# Patient Record
Sex: FEMALE | Race: WHITE | Employment: FULL TIME | URBAN - METROPOLITAN AREA
[De-identification: names, ages, dates, MRNs, and addresses within clinical notes are randomized per-mention and may not be internally consistent; named-entity substitution may affect disease eponyms.]

---
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
## 2022-01-26 ENCOUNTER — APPOINTMENT (OUTPATIENT)
Dept: CT IMAGING | Age: 22
End: 2022-01-26
Payer: COMMERCIAL

## 2022-01-26 ENCOUNTER — HOSPITAL ENCOUNTER (EMERGENCY)
Age: 22
Discharge: HOME | End: 2022-01-27
Payer: COMMERCIAL

## 2022-01-26 VITALS
RESPIRATION RATE: 16 BRPM | TEMPERATURE: 98.1 F | OXYGEN SATURATION: 100 % | HEART RATE: 100 BPM | DIASTOLIC BLOOD PRESSURE: 70 MMHG | SYSTOLIC BLOOD PRESSURE: 128 MMHG

## 2022-01-26 DIAGNOSIS — S06.0X1A CLOSED HEAD INJURY WITH CONCUSSION, WITH LOSS OF CONSCIOUSNESS OF 30 MINUTES OR LESS, INITIAL ENCOUNTER: Primary | ICD-10-CM

## 2022-01-26 DIAGNOSIS — S16.1XXA CERVICAL STRAIN, ACUTE, INITIAL ENCOUNTER: ICD-10-CM

## 2022-01-26 PROCEDURE — 6370000000 HC RX 637 (ALT 250 FOR IP): Performed by: PHYSICIAN ASSISTANT

## 2022-01-26 PROCEDURE — 72125 CT NECK SPINE W/O DYE: CPT

## 2022-01-26 PROCEDURE — 6360000002 HC RX W HCPCS: Performed by: PHYSICIAN ASSISTANT

## 2022-01-26 PROCEDURE — 99284 EMERGENCY DEPT VISIT MOD MDM: CPT

## 2022-01-26 PROCEDURE — 96372 THER/PROPH/DIAG INJ SC/IM: CPT

## 2022-01-26 PROCEDURE — 70450 CT HEAD/BRAIN W/O DYE: CPT

## 2022-01-26 RX ORDER — PROCHLORPERAZINE MALEATE 5 MG/1
5 TABLET ORAL ONCE
Status: COMPLETED | OUTPATIENT
Start: 2022-01-27 | End: 2022-01-27

## 2022-01-26 RX ORDER — DIPHENHYDRAMINE HCL 25 MG
25 TABLET ORAL ONCE
Status: COMPLETED | OUTPATIENT
Start: 2022-01-27 | End: 2022-01-27

## 2022-01-26 RX ORDER — KETOROLAC TROMETHAMINE 30 MG/ML
30 INJECTION, SOLUTION INTRAMUSCULAR; INTRAVENOUS ONCE
Status: COMPLETED | OUTPATIENT
Start: 2022-01-26 | End: 2022-01-26

## 2022-01-26 RX ORDER — ACETAMINOPHEN 325 MG/1
650 TABLET ORAL ONCE
Status: COMPLETED | OUTPATIENT
Start: 2022-01-26 | End: 2022-01-26

## 2022-01-26 RX ADMIN — KETOROLAC TROMETHAMINE 30 MG: 30 INJECTION, SOLUTION INTRAMUSCULAR at 23:08

## 2022-01-26 RX ADMIN — ACETAMINOPHEN 650 MG: 325 TABLET ORAL at 22:30

## 2022-01-26 ASSESSMENT — PAIN DESCRIPTION - LOCATION: LOCATION: HEAD

## 2022-01-26 ASSESSMENT — PAIN DESCRIPTION - PAIN TYPE: TYPE: ACUTE PAIN

## 2022-01-26 ASSESSMENT — PAIN SCALES - GENERAL
PAINLEVEL_OUTOF10: 8

## 2022-01-26 ASSESSMENT — ENCOUNTER SYMPTOMS
SHORTNESS OF BREATH: 0
VOMITING: 0
ABDOMINAL PAIN: 0

## 2022-01-26 ASSESSMENT — PAIN DESCRIPTION - DESCRIPTORS: DESCRIPTORS: HEADACHE

## 2022-01-27 PROCEDURE — 6370000000 HC RX 637 (ALT 250 FOR IP): Performed by: PHYSICIAN ASSISTANT

## 2022-01-27 RX ADMIN — DIPHENHYDRAMINE HCL 25 MG: 25 TABLET ORAL at 00:12

## 2022-01-27 RX ADMIN — PROCHLORPERAZINE MALEATE 5 MG: 5 TABLET, FILM COATED ORAL at 00:13

## 2022-01-27 NOTE — ED PROVIDER NOTES
[address] ED  EMERGENCY DEPARTMENT ENCOUNTER        Pt Name: Ana Moses  MRN: [id number]  [place] 2000  Date of evaluation: 1/26/2022  Provider: Frankie Boogie PA-C  PCP: No primary care provider on file. Shared Visit or Autonomous Visit: ALEX. I have evaluated this patient. My supervising physician was available for consultation. CHIEF COMPLAINT       Chief Complaint   Patient presents with    Work Related Injury     hit heads with another person yesterday and then hit head in same spot on table today/sent by urgent care/reports LOC for 45 seconds       HISTORY OF PRESENT ILLNESS   (Location/Symptom, Timing/Onset, Context/Setting, Quality, Duration, Modifying Factors, Severity)  Note limiting factors. Ana Moses is a 22 y.o. female presenting to the emergency department for evaluation of head injury. She was at work yesterday states she hit heads with a kid they were on a mat and when he rolled over she was hit in the forehead she stood up and then blacked out states think she got up too quickly states she was out for about 30 minutes. Has been feeling dizzy vertigo symptoms ringing in the ears having headache and today she bent down to get something from under a table and hit her forehead again in the same area on the table and states she blacked out for about 30 seconds at that time. She went to urgent care today and was sent to the ER for evaluation. No vomiting. Initially did not have neck pain started after arrival here. No other injuries. Ambulatory. Last took migraine medicine 8 AM this morning. She does have history of chronic migraines history of concussion 2018. The history is provided by the patient.    Head Injury  Location:  Frontal  Time since incident:  1 day  Chronicity:  New  Associated symptoms: headache, loss of consciousness, neck pain and tinnitus    Associated symptoms: no disorientation, no numbness and no vomiting        Nursing Notes were reviewed    REVIEW OF SYSTEMS    (2-9 systems for level 4, 10 or more for level 5)     Review of Systems   Constitutional: Negative for fever. HENT: Positive for tinnitus. Eyes: Negative for visual disturbance. Respiratory: Negative for shortness of breath. Cardiovascular: Negative for chest pain. Gastrointestinal: Negative for abdominal pain and vomiting. Musculoskeletal: Positive for neck pain. Neurological: Positive for dizziness, loss of consciousness and headaches. Negative for numbness. All other systems reviewed and are negative. Positives and Pertinent negatives as per HPI. PAST MEDICAL HISTORY   No past medical history on file. SURGICAL HISTORY   No past surgical history on file. CURRENTMEDICATIONS       There are no discharge medications for this patient. ALLERGIES     Amoxicillin and Augmentin [amoxicillin-pot clavulanate]    FAMILYHISTORY     No family history on file. SOCIAL HISTORY       Social History     Socioeconomic History    Marital status: Single     Spouse name: Not on file    Number of children: Not on file    Years of education: Not on file    Highest education level: Not on file   Occupational History    Not on file   Tobacco Use    Smoking status: Not on file    Smokeless tobacco: Not on file   Substance and Sexual Activity    Alcohol use: Not on file    Drug use: Not on file    Sexual activity: Not on file   Other Topics Concern    Not on file   Social History Narrative    Not on file     Social Determinants of Health     Financial Resource Strain:     Difficulty of Paying Living Expenses: Not on file   Food Insecurity:     Worried About Running Out of Food in the Last Year: Not on file    Leanne of Food in the Last Year: Not on file   Transportation Needs:     Lack of Transportation (Medical): Not on file    Lack of Transportation (Non-Medical):  Not on file   Physical Activity:     Days of Exercise per Week: Not on file    Minutes of Exercise per Session: Not on file   Stress:     Feeling of Stress : Not on file   Social Connections:     Frequency of Communication with Friends and Family: Not on file    Frequency of Social Gatherings with Friends and Family: Not on file    Attends Faith Services: Not on file    Active Member of [address] [place] [address] or Organizations: Not on file    Attends Club or Organization Meetings: Not on file    Marital Status: Not on file   Intimate Partner Violence:     Fear of Current or Ex-Partner: Not on file    Emotionally Abused: Not on file    Physically Abused: Not on file    Sexually Abused: Not on file   Housing Stability:     Unable to Pay for Housing in the Last Year: Not on file    Number of [place] in the Last Year: Not on file    Unstable Housing in the Last Year: Not on file       SCREENINGS    Sycamore Coma Scale  Eye Opening: Spontaneous  Best Verbal Response: Oriented  Best Motor Response: Obeys commands  Sycamore Coma Scale Score: 15        PHYSICAL EXAM    (up to 7 for level 4, 8 or more for level 5)     ED Triage Vitals [01/26/22 2131]   BP Temp Temp Source Pulse Resp SpO2 Height Weight   128/70 98.1 °F (36.7 °C) Temporal 100 16 100 % -- --       Physical Exam  Vitals and nursing note reviewed. Constitutional:       Appearance: She is well-developed. She is not toxic-appearing. HENT:      Head: Normocephalic. Contusion present. No raccoon eyes, Reed's sign or laceration. Right Ear: Tympanic membrane and ear canal normal. No hemotympanum. Left Ear: Tympanic membrane and ear canal normal. No hemotympanum. Mouth/Throat:      Pharynx: Oropharynx is clear. No pharyngeal swelling or posterior oropharyngeal erythema. Eyes:      Extraocular Movements: Extraocular movements intact. Conjunctiva/sclera: Conjunctivae normal.      Pupils: Pupils are equal, round, and reactive to light. Neck:      Vascular: No JVD.    Cardiovascular:      Rate and Rhythm: Normal rate and regular rhythm. Pulmonary:      Effort: Pulmonary effort is normal. No respiratory distress. Breath sounds: Normal breath sounds. No stridor. No wheezing, rhonchi or rales. Abdominal:      General: Bowel sounds are normal. There is no distension. Palpations: Abdomen is soft. Abdomen is not rigid. There is no mass. Tenderness: There is no abdominal tenderness. There is no guarding or rebound. Musculoskeletal:         General: Normal range of motion. Cervical back: Normal, normal range of motion and neck supple. No tenderness or bony tenderness. Normal range of motion. Skin:     General: Skin is warm and dry. Findings: No rash. Neurological:      Mental Status: She is alert and oriented to person, place, and time. GCS: GCS eye subscore is 4. GCS verbal subscore is 5. GCS motor subscore is 6. Cranial Nerves: Cranial nerves are intact. No cranial nerve deficit. Sensory: Sensation is intact. No sensory deficit. Motor: Motor function is intact. No weakness (5/5 symmetric upper and lower extremities ) or abnormal muscle tone. Coordination: Coordination is intact. Coordination normal. Finger-Nose-Finger Test normal.      Gait: Gait is intact. Psychiatric:         Behavior: Behavior normal.         DIAGNOSTIC RESULTS   LABS:    Labs Reviewed - No data to display    All other labs were within normal range or not returned as of this dictation. EKG: All EKG's are interpreted by the Emergency Department Physician in the absence of a cardiologist.  Please see their note for interpretation of EKG.       RADIOLOGY:   Non-plain film images such as CT, Ultrasound and MRI are read by the radiologist. Plain radiographic images are visualized andpreliminarily interpreted by the  ED Provider with the below findings:        Interpretation perthe Radiologist below, if available at the time of this note:    CT CERVICAL SPINE WO CONTRAST   Final Result   No acute fracture traumatic malalignment of the cervical spine. CT HEAD WO CONTRAST   Final Result   No acute intracranial abnormality. PROCEDURES   Unless otherwise noted below, none     Procedures    CRITICAL CARE TIME   N/A    CONSULTS:  None      EMERGENCY DEPARTMENT COURSE and DIFFERENTIAL DIAGNOSIS/MDM:   Vitals:    Vitals:    01/26/22 2131   BP: 128/70   Pulse: 100   Resp: 16   Temp: 98.1 °F (36.7 °C)   TempSrc: Temporal   SpO2: 100%       Patient was given thefollowing medications:  Medications   acetaminophen (TYLENOL) tablet 650 mg (650 mg Oral Given 1/26/22 2230)   ketorolac (TORADOL) injection 30 mg (30 mg IntraMUSCular Given 1/26/22 2308)   prochlorperazine (COMPAZINE) tablet 5 mg (5 mg Oral Given 1/27/22 0013)   diphenhydrAMINE (BENADRYL) tablet 25 mg (25 mg Oral Given 1/27/22 0012)       ED course  Patient presented to the ER for evaluation of head injury that occurred yesterday after hitting heads with another person and stating she blacked out afterwards. She had another head injury today when she hit her head on the table and states she also briefly lost consciousness. Was initially seen at urgent care and then sent to the ER for evaluation. Normal neurological exam here. No neuro deficits. Up and walking without any difficulty. No vomiting. She does complain of headache, ringing in the ears, dizziness and now also neck pain. She was provided medications here and work-up was obtained. CT brain no acute intracranial normality, no hemorrhage, no fracture, cervical spine no fracture or misalignment. Suspect concussion. Advised brain rest. Continue Tylenol and ibuprofen for pain. Advise close follow-up and return for any worsening. She was provided referral to [address] [place] [address] for work-related injury as well as neurology if needed.       I estimate there is LOW risk for SKULL FRACTURE, SUBARACHNOID HEMORRHAGE, INTRACRANIAL HEMORRHAGE, CERVICAL SPINE INJURY, SUBDURAL OR EPIDURAL HEMATOMA,  thus I consider the discharge disposition reasonable. FINAL IMPRESSION      1. Closed head injury with concussion, with loss of consciousness of 30 minutes or less, initial encounter    2. Cervical strain, acute, initial encounter          DISPOSITION/PLAN   DISPOSITION     PATIENT REFERREDTO:  [facility]  [address]  [address] Christina Gamez [address]  [phone]    Call in 1 day  Call to arrange follow-up appointment for work-related injury    Tyler Lamar [address] [facility] [phone]    As needed    [facility] - Neurology  [facility] [address]  [phone]    As needed    [facility] ED  [address] [facility]  [phone]    If symptoms worsen      DISCHARGE MEDICATIONS:  There are no discharge medications for this patient. DISCONTINUED MEDICATIONS:  There are no discharge medications for this patient.              (Please note that portions ofthis note were completed with a voice recognition program.  Efforts were made to edit the dictations but occasionally words are mis-transcribed.)    Cooper Estrada PA-C (electronically signed)            Quyen Ivy PA-C  01/27/22 5424

## 2022-02-25 ENCOUNTER — HOSPITAL ENCOUNTER (OUTPATIENT)
Dept: PHYSICAL THERAPY | Age: 22
Setting detail: THERAPIES SERIES
Discharge: HOME | End: 2022-02-25
Payer: COMMERCIAL

## 2022-02-25 PROCEDURE — 95992 CANALITH REPOSITIONING PROC: CPT

## 2022-02-25 PROCEDURE — 97162 PT EVAL MOD COMPLEX 30 MIN: CPT

## 2022-02-25 PROCEDURE — 97112 NEUROMUSCULAR REEDUCATION: CPT

## 2022-02-25 NOTE — FLOWSHEET NOTE
[facility] and Therapy, [facility], [facility] [address], [address]   Phone: [phone]  Fax [phone]    Physical Therapy Daily Treatment Note    Date:  2022    Patient Name:  Brendon Barker    :  2000  MRN: [id number]  Restrictions/Precautions:    Medical/Treatment Diagnosis Information:  · Diagnosis:   · S09.90XA (ICD-10-CM) - Unspecified injury of head, initial encounter    · S16. 1XXA (ICD-10-CM) - Strain of muscle, fascia and tendon at neck level, initial encounter  Insurance/Certification information:  PT Insurance Information: Adilson Menard - approved 8 visits (2x/week for 4 weeks) from 22 through 3/17/22  Physician Information:  Referring Practitioner: LADARIUS Reynoso  Plan of care signed (Y/N):  N  Visit# / total visits:       G-Code (if applicable):          [address] [place] [address]  22    Medicare Cap (if applicable):  n/a = total amount used, updated 2022    CPT Code # of charges Time In Time Out Total Time   EVAL:   [] (Low) 19596   [x] (Medium) 18342   [] (High) 35994 1 9:15 9:45 30   Re-Eval       NJ(83511)        NMR (45643) 1 10:00 10:15 15   Manual (09390)       TA (39121)       Gait (94145)       E-stim (un) (78395)                  E-stim (attended) (99958)       Ionto       Vaso       [employer] (58863)             Other: CRT (36188) 1 9:45 10:00 15   Totals   3 9:15 10:15 60     ________________________________________________________________________________________    Pain Level:    /10  SUBJECTIVE:  See initial eval    OBJECTIVE:     Exercise/Equipment Resistance/Repetitions Other comments          Canalith repositioning for R PSC canalithiasis x1 round Intense vertigo which initially stopped her from lying all the way down. No nystagmus once supine and head hanging. Noticed ear popped in position 3. Had a \"sinking\" feeling upon sitting with increased headache that settled after 60 seconds. Other Therapeutic Activities:  Education regarding POC including demonstration with models of anatomy and physiology in order to maximize benefits of treatment; total neuro re-ed 15 minutes    Manual Treatments:         Modalities:      Test/Measurements:  See initial eval       ASSESSMENT:     See initial eval     Treatment/Activity Tolerance:   [x]Patient tolerated treatment well [] Patient limited by fatique  []Patient limited by pain [] Patient limited by other medical complications  [] Other:     GOALS: Goals established 2/25/22   Patient stated goal: improve dizziness  []? Progressing: []? Met: []? Not Met: []? Adjusted     Therapist goals for Patient:  Short Term Goals: To be achieved in: 2 weeks  1. Independent in HEP and progression per patient tolerance, in order to prevent re-injury. []? Progressing: []? Met: []? Not Met: []? Adjusted  2. Patient will have a decrease in dizziness/imbalance/symptoms to 25% to facility improvement in movement, function, balance, and ADLS as indicated by Functional Deficits. []? Progressing: []? Met: []? Not Met: []? Adjusted     Long Term Goals: To be achieved in:  4 weeks  1. Disability index score of 25% or less for the [facility] to assist with reaching prior level of function  []? Progressing: []? Met: []? Not Met: []? Adjusted  2. Patient will demonstrate increased cervical AROM to WNL, joint mobility WNL to allow for proper joint functioning as indicated by Functional Deficits. []? Progressing: []? Met: []? Not Met: []? Adjusted  3. Patient will demonstrate negative oculomotor special testing/positional testing as indicated by Functional Deficits. []? Progressing: []? Met: []? Not Met: []? Adjusted  4. Patient will return to functional activities including lying down, looking up, rolling over without increased symptoms or restriction. []? Progressing: []?  Met: []? Not Met: []? Adjusted  5. Patient will complete full work day without onset of symptoms (patient specific functional goal)  []? Progressing: []? Met: []? Not Met: []?  Adjusted     Plan: [] Continue per plan of care [] Alter current plan (see comments)   [x] Plan of care initiated [] Hold pending MD visit [] Discharge      Plan for Next Session:  Vestibular rehab    Re-Certification Due Date:         Signature:  Yulisa Floyd, PT

## 2022-02-25 NOTE — PROGRESS NOTES
[facility], [facility], [address], [address]  Phone: [phone]  Fax [phone]    Physical Therapy Vestibular Rehabilitation Evaluation  Date:  2022    Dear Referring Practitioner: LADARIUS Boggs,     We had the pleasure of evaluating the following patient for physical therapy services at [address]. A summary of our findings can be found in the initial assessment below. This includes our plan of care. If you have any questions or concerns regarding these findings, please do not hesitate to contact me at the office phone number checked above. Thank you for the referral.      Physician Signature:_______________________________Date:__________________     By signing above (or electronic signature), therapists plan is approved by physician       Patient: Sharon Salgado   : 2000   MRN: [id number]   Referring Physician: Referring Practitioner: LADARIUS Boggs       Evaluation Date: 2022       Medical Diagnosis Information:   Diagnosis:   · S09.90XA (ICD-10-CM) - Unspecified injury of head, initial encounter    · S16. 1XXA (ICD-10-CM) - Strain of muscle, fascia and tendon at neck level, initial encounter                                               Insurance information: PT Insurance Information: Adalid Mayte - approved 8 visits (2x/week for 4 weeks) from 22 through 3/17/22     Precautions/ Contra-indications: none   Latex Allergy:  NO        Preferred Language for Healthcare:   English       other:     CPT Code # of charges Time In Time Out Total Time   EVAL:   [] (Low) 90620   [x] (Medium) 70987   [] (High) 36292 1 9:15 9:45 30   Re-Eval       NJ(92635)        NMR (61845) 1 10:00 10:15 15   Manual (72005)       TA (65922)       Gait (39990)       E-stim (un) (50168)                  E-stim (attended) (43731)       Ionto       Vaso       [employer] (17194) Other: CRT (14151) 1 9:45 10:00 15   Totals   3 9:15 10:15 60         ______________________________________________________________________    SUBJECTIVE:      Referral Date: 1/26/22  Onset Date:  1/25/22 and 1/26/22  Relevant Medical History: anxiety, depression, chronic migraine syndrome    Chief Complaint: Pt reports that she bumped heads with a child at work and blacked out for about 30 seconds. Then the next day bent over at work and bumped her head in the same place and had onset of B hand and feet numbness that lasted for about a minute. Since then she has had chronic headache right at the temples, as well as intense waves of vertigo where the room will spin and she will feel a hot wave of nausea come over her for 30-90 minutes. She can have vomiting with the nausea waves. The only way to relieve it is to go lie down, which isn't always possible at work. Her normal migraines are across the forehead, heaviness that pulsates. The difference is that the temple headache feels more like a stabbing. She notes that she has tried a multitude of things for her migraines in the past, which have not been helpful. She also has history of anxiety and depression and has recently been dealing with anger outbursts - not sure if they are related to the vertigo. Currently in the process of getting new medication for anxiety and depression through Dr. Rainer Aguilar (her new  doctor). Description of symptoms: [x] Vertigo [x]  Off-balance [] Lightheadedness  Scale: Denies currently/10  Symptoms are getting:  [] Better [] Worse  [x] Same [] Episodic  Description of Spells:  [] Constant [x] Spontaneous [] Induced by motion   [] Induced by position changes [] Other:  Length of time spells occur: [] Seconds [x] Minutes  [x] Hours [] Days [] Other:   What increases symptoms? Anything - cannot determine an antecedent  What decreases symptoms?  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   [] Yes, Patient intake triggered further evaluation      [] C-SSRS Screening completed  [] PCP notified via Plan of Care  [] Emergency services notified    OBJECTIVE:     Musculoskeletal Screen  Cervical spine complaints:  Increased neck pain that sends headaches up her head  Cervical Pain: 2/10 currently  Cervical spine ROM:  []  WNL [x] Impaired: decreased rotation B 25%     LE ROM:    RIGHT LEFT    Hip Blue/[facility]    Knee Blue/[facility]    Ankle [facility] WFL     LE Strength:   RIGHT LEFT    Hip flexion [facility]    Hip IR [facility] WFL    Hip ER [facility] WFL    Knee extension [facility] WFL    Knee flexion [facility] WFL    Ankle WFL WFL     Posture:  Slight forward head    Somatosensory:  Light touch:  [x] Normal [] Impaired Comments:    Coordination:  Rapid alternating movements: [] Normal [] Dysdiadokinesia   Finger to Nose:   [] Normal [] Dysmetric  Heel to Shin:    [] Normal [] Ataxic    Postural Control Tests:  Clinical Test of Sensory Interaction for Balance (CTSIB) performed in Romberg stance  CONDITION TIME STRATEGY SWAY    Eyes open, firm surface Deferred this date due to time      Eyes closed, firm surface       Eyes open, foam surface       Eyes closed, foam surface        Tandem stance: deferred    Gait:    Assistive Device: N     Orthosis: N   At self-initiated pace:  Mary: WFL  SUNNY: WFL        Arm swing: Y   Head/trunk rotation: Y      Straight path: Y  Swerves: N      Staggers: N   Side-steps: N   Gait speed: WFL    Oculomotor/Vestibular Examination: Pt's eyes do not look straight, L eye appears to be turned in slightly compared to R. They move together in each direction, but L eye always seems slightly inward. Spontaneous nystagmus:  [] Left  [] Right [x] Absent  Gaze-Evoked nystagmus with fixation present:   Primary [] Present [x] Absent   Right  [] Present [x] Absent   Left  [] Present [x] Absent  VOR Head Thrust Test: [] Normal [x] Abnormal  Comments: delayed B, non consistently  VOR Cancellation:  [x] Normal [] Abnormal Comments:   Smooth Pursuit:  [] Normal [x] Abnormal Comments: saccadic intrusions  Saccades:   [x] Normal [] Abnormal Comments:   Convergence:   [] Normal [x] Abnormal Comments:  20 cm from nose  Test of skew   [x] Normal [] Abnormal Comments:      Positional Testing  R Hallpike-Claire maneuver:    Nystagmus:  [] Yes  [x] No  [] Duration:       [] Direction:    Vertigo:  [x] Yes  [] No  [x] Duration: happened about 60% into the maneuver, but the time she was head hanging, spinning was over, but nausea remained for about 2 minutes, unable to appreciate nystagmus  L Hallpike-Claire maneuver:   Nystagmus:  [] Yes  [] No  [] Duration:       [] Direction:    Vertigo:  [] Yes  [] No  [] Duration:   Supine roll head right:   Nystagmus:  [] Yes  [] No  [] Duration:       [] Direction:    Vertigo:  [] Yes  [] No  [] Duration:   Supine roll head left:   Nystagmus:  [] Yes  [] No  [] Duration:       [] Direction:    Vertigo:  [] Yes  [] No  [] Duration:     Outcome Measures  Dizziness Handicap Index ([facility] [address])    50    [x] Patient history, allergies, meds reviewed. Medical chart reviewed. See intake form. Review Of Systems (ROS):  [x]Performed Review of systems (Integumentary, CardioPulmonary, Neurological) by intake and observation. Intake form has been scanned into medical record.  Patient has been instructed to contact their primary care physician regarding ROS issues if not already being addressed at this time. Co-morbidities/Complexities (which will affect course of rehabilitation):   []None           Arthritic conditions   []Rheumatoid arthritis (M05.9)  []Osteoarthritis (M19.91)   Cardiovascular conditions   []Hypertension (I10)  []Hyperlipidemia (E78.5)  []Angina pectoris (I20)  []Atherosclerosis (I70)   Musculoskeletal conditions   []Disc pathology   []Congenital spine pathologies   []Prior surgical intervention  []Osteoporosis (M81.8)  []Osteopenia (M85.8)   Endocrine conditions   []Hypothyroid (E03.9)  []Hyperthyroid Gastrointestinal conditions   []Constipation (T78.07)   Metabolic conditions   []Morbid obesity (E66.01)  []Diabetes type 1(E10.65) or 2 (E11.65)   []Neuropathy (G60.9)     Pulmonary conditions   []Asthma (J45)  []Coughing   []COPD (J44.9)   Psychological Disorders  [x]Anxiety (F41.9)  [x]Depression (F32.9)   []Other:   []Other:            Barriers to/and or personal factors that will affect rehab potential:             []Age  []Sex    []Smoker              []Motivation/Lack of Motivation                        [x]Co-Morbidities (anxiety, migraine)              []Cognitive Function, education/learning barriers              []Environmental, home barriers              []profession/work barriers  []past PT/medical experience  []other:  Justification:     Falls Risk Assessment (30 days):   [x] Falls Risk assessed and no intervention required. 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 She has a history of migraines, and her subjective history is consistent with vestibular migraine.  However, she had intense vertigo about 60% into the DixHallpike maneuver. Treated with one round of canalith repositioning and had a significant sinking feeling upon sitting, consistent with otolith repositioning. Additionally, patient's eyes do not appear to have the same null point, with the L eye slightly turned in. This could contribute to her headache and visual symptoms and would require medical evaluation from a different specialty. Recommend continued vestibular rehab to address concussive symptoms, positional vertigo, and gaze stability at twice a week for four weeks.       Functional Impairments:     [x] BPPV    [x] Right [x] Posteror Canal [x] Canalithiasis    [] Left  [] Horizontal Canal [] Cupulolithiasis   [x]Decreased Gaze Stabilization   []Increased Motion Sensitivity   []Unilateral Vestibular Hypofunction   []Bilateral Vestibular Hypofunction   []Gait Instability   [x]Decreased tolerance for ADLs/work       []Decreased functional strength    [x]Reduced Balance/Proprioceptive control     [x]Reduced ability to/focus    []Noted cervical/thoracic/GHJ joint hypomobility    []Noted cervical/thoracic/GHJ joint hypermobility    [x]Decreased cervical/UE functional ROM    [x]Noted Headache pain aggravated by neck movements with/without dizziness    []Abnormal reflexes/sensation/myotomal/dermatomal deficits    []Decreased DCF control or ability to hold head up    []Decreased RC/scapular/core strength and neuromuscular control    []Other:     Functional Activity Limitations (from functional questionnaire and intake)   []Reduced ability to tolerate prolonged functional positions   [x]Reduced ability or difficulty with changes of positions or transfers between positions    [x]Reduced ability to transfer in/out of bed or rolling in bed    [x]Reduced ability or tolerance with driving, reading and/or computer work    []Reduced ability to perform lifting, reaching, carrying tasks    [x]Reduced ability to forward bend   []Reduced ability to ambulate prolonged functional periods/distances/surfaces    []Reduced ability to ascend/descend stairs    [x]Reduced ability to concentrate/focus    [x]Reduced ability to turn/pitch head rapidly    [x]Reduced ability to self-correct for losses of balance   []Other:        Participation Restrictions    []Reduced participation in self care activities    [x]Reduced participation in home management activities    [x]Reduced participation in work activities    []Reduced participation in social activities    [x]Reduced participation in sport/recreational activities    Classification :    [x]Signs/symptoms consistent with BPPV (benign paroxysmal positional vertigo)       []Signs/symptoms consistent with unilateral peripheral vestibulopathy (i.e., vestibular neuritis, labyrinthitis, acoustic neuroma)     []Signs/symptoms consistent with central vestibulopathy    [x]Signs/symptoms consistent with migraine-related vestibulopathy    []Signs/symptoms consistent with Menieres disease / post-traumatic hydrops    []Signs/symptoms consistent with perilymphatic fistula    [x]Signs/symptoms consistent with cervicogenic dizziness    [x]Signs/symptoms consistent with gait instability    []Signs/symptoms consistent with motion sensitivity      []Signs/symptoms consistent with neck pain with mobility deficits      []Signs/symptoms consistent with neck pain with movement coordinated impairments     []Signs/symptoms consistent with neck pain with radiating pain     [x]Signs/symptoms consistent with neck pain with headaches (cervicogenic)     []Signs/symptoms consistent with nerve root involvement including myotome & dermatome dysfunction    []Signs/symptoms consistent with facet dysfunction of cervical and thoracic spine     []Signs/symptoms consistent suggesting central cord compression/UMN syndromes    []Signs/symptoms consistent with discogenic cervical pain    []Signs/symptoms consistent with rib dysfunction []Signs/symptoms consistent with postural dysfunction    []Signs/symptoms consistent with shoulder pathology     []Signs/symptoms consistent with post-surgical status including decreased ROM, strength and function. []Signs/symptoms which may limit the use of advanced manual therapy techniques: (Elevated CV risk profile, recent trauma, intolerance to end range positions, prior TIA, visual issues, UE neurological compromise)    []Other:      Prognosis/Rehab Potential:      []Excellent    [x]Good    []Fair    []Poor    Tolerance of evaluation/treatment:     []Excellent    [x]Good    []Fair    []Poor     Physical Therapy Evaluation Complexity Justification   [x] A history of present problem with:  [] no personal factors and/or comorbidities that impact the plan of care;  [x]1-2 personal factors and/or comorbidities that impact the plan of care  []3 personal factors and/or comorbidities that impact the plan of care  [x] An examination of body systems using standardized tests and measures addressing any of the following: body structures and functions (impairments), activity limitations, and/or participation restrictions;:  [x] a total of 1-2 or more elements   [] a total of 3 or more elements   [] a total of 4 or more elements   [x] A clinical presentation with:  [] stable and/or uncomplicated characteristics   [x] evolving clinical presentation with changing characteristics  [] unstable and unpredictable characteristics;   [x] Clinical decision making of moderate complexity using standardized patient assessment instrument and/or measurable assessment of functional outcome.      []EVAL (LOW) 34129 (typically 20 minutes face-to-face)   [x]EVAL (MOD) 74436 (typically 30 minutes face-to-face)   []EVAL (HIGH) 45407 (typically 45 minutes face-to-face)   []RE-EVAL    PLAN:   Today's Treatment:    [x] See flowsheet   [x] Patient treated with canalith repositioning maneuver   [x] Education materials provided on BPPV/Vestibular Dysfunction   [x] Precautions provided and patient to follow precautions for next 24 hours in regards to BPPV management   [] Written home exercise instructions   [] Other:    Frequency/Duration:  2 days per week for 4 Weeks:  Interventions:   [x]Therapeutic exercise including: strength training and ROM for Upper extremity, Lower extremity, spine, and Core    [x]NMR activation and proprioception for BLEs, vestibular training, balance, and coordination    [x]Manual therapy as indicated for UE, LE and spine to include: Dry Needling/IASTM, STM, PROM, Gr I-IV mobilizations, manipulation. [x]Vestibular rehabilitation to include canalith repositioning maneuvers, gaze stabilization, and habituation as indicated   []Gait training   []WC training   []Home Management training of patient and/or caregiver   []Modalities as needed that may include: thermal agents, E-stim, Biofeedback, US, iontophoresis as indicated   [x]Patient education on BPPV/vestibular function, balance, postural re-education, activity modification, progression of HEP. []Other:     GOALS: Goals established 2/25/22   Patient stated goal: improve dizziness  [] Progressing: [] Met: [] Not Met: [] Adjusted    Therapist goals for Patient:  Short Term Goals: To be achieved in: 2 weeks  1. Independent in HEP and progression per patient tolerance, in order to prevent re-injury. [] Progressing: [] Met: [] Not Met: [] Adjusted  2. Patient will have a decrease in dizziness/imbalance/symptoms to 25% to facility improvement in movement, function, balance, and ADLS as indicated by Functional Deficits. [] Progressing: [] Met: [] Not Met: [] Adjusted    Long Term Goals: To be achieved in:  4 weeks  1. Disability index score of 25% or less for the [address] [place] [address] to assist with reaching prior level of function  [] Progressing: [] Met: [] Not Met: [] Adjusted  2.  Patient will demonstrate increased cervical AROM to WNL, joint mobility WNL to allow for proper joint functioning as indicated by Functional Deficits. [] Progressing: [] Met: [] Not Met: [] Adjusted  3. Patient will demonstrate negative oculomotor special testing/positional testing as indicated by Functional Deficits. [] Progressing: [] Met: [] Not Met: [] Adjusted  4. Patient will return to functional activities including lying down, looking up, rolling over without increased symptoms or restriction. [] Progressing: [] Met: [] Not Met: [] Adjusted  5.  Patient will complete full work day without onset of symptoms (patient specific functional goal)  [] Progressing: [] Met: [] Not Met: [] Adjusted    Electronically signed by:  Vielka Kam, PT  , DPT [id number]

## 2022-02-28 ENCOUNTER — APPOINTMENT (OUTPATIENT)
Dept: PHYSICAL THERAPY | Age: 22
End: 2022-02-28
Payer: COMMERCIAL

## 2022-03-04 ENCOUNTER — HOSPITAL ENCOUNTER (OUTPATIENT)
Dept: PHYSICAL THERAPY | Age: 22
Setting detail: THERAPIES SERIES
Discharge: HOME | End: 2022-03-04
Payer: COMMERCIAL

## 2022-03-04 NOTE — PROGRESS NOTES
Physical Therapy  Cancellation/No-show Note  Patient Name:  Rehan Christie  :  2000   Date:  3/4/2022  Cancels to date: 1  No-shows to date: 0    For today's appointment patient:  [x] Cancelled  [] Rescheduled appointment  [] No-show     Reason given by patient:  [x] Patient ill - stomach bug  [] Conflicting appointment  [] No transportation    [] Conflict with work  [] No reason given  [] Other:     Comments:      Electronically signed by:  Rossana Kumar PT

## 2022-03-07 ENCOUNTER — HOSPITAL ENCOUNTER (OUTPATIENT)
Dept: PHYSICAL THERAPY | Age: 22
Setting detail: THERAPIES SERIES
Discharge: HOME | End: 2022-03-07
Payer: COMMERCIAL

## 2022-03-07 PROCEDURE — 97140 MANUAL THERAPY 1/> REGIONS: CPT

## 2022-03-07 PROCEDURE — 97110 THERAPEUTIC EXERCISES: CPT

## 2022-03-07 NOTE — FLOWSHEET NOTE
Nita  80. and Therapy, [facility], [address] [place] [facility], [address]  Phone: [phone]  Fax [phone]    Physical Therapy Daily Treatment Note    Date:  3/7/2022    Patient Name:  Corinne Braga    :  2000  MRN: [id number]  Restrictions/Precautions:    Medical/Treatment Diagnosis Information:  · Diagnosis:   · S09.90XA (ICD-10-CM) - Unspecified injury of head, initial encounter    · S16. 1XXA (ICD-10-CM) - Strain of muscle, fascia and tendon at neck level, initial encounter  Insurance/Certification information:  PT Insurance Information: Radha Whitehall - approved 8 visits (2x/week for 4 weeks) from 22 through 3/17/22  Physician Information:  Referring Practitioner: LADARIUS Currie  Plan of care signed (Y/N):  N  Visit# / total visits:       G-Code (if applicable):          [address] [place] [address]  22    Medicare Cap (if applicable):  n/a = total amount used, updated 3/7/2022    CPT Code # of charges Time In Time Out Total Time   EVAL:   [] (Low) 62254   [] (Medium) 13464   [] (High) 27236       Re-Eval       TK(61400)  1 10:30 10:45 15   NMR (37534)       Manual (11682) 2 10:00 10:30 30   TA (86360)       Gait (67953)       E-stim (un) (86224)                  E-stim (attended) (46382)       Ionto       Vaso       Mech Traction (16631)             Other: CRT (15455)       Totals   3 10:00 10:45 45     ________________________________________________________________________________________    Pain Level:   2/10   SUBJECTIVE:  Headaches are about the same. Got new glasses. Was nauseated for about 20 minutes after last session, but Zofran helped. Started a new medication for sleep over the weekend, but also had a stomach bug all weekend. Dizziness doesn't seem as bad. States that the room spins when she's lying down (doesn't matter which position) if she opens her eyes. Doesn't stop, just stays spinning.      OBJECTIVE:     Exercise/Equipment Resistance/Repetitions Other comments          Canalith repositioning for R PSC canalithiasis  Intense vertigo which initially stopped her from lying all the way down. No nystagmus once supine and head hanging. Noticed ear popped in position 3. Had a \"sinking\" feeling upon sitting with increased headache that settled after 60 seconds. Self-snag for C1/2 x6 minutes including instruction HEP   Self suboccipital release with tennis balls x4 mins including instruction HEP                                                                                                          Other Therapeutic Activities:  Education regarding POC including demonstration with models of anatomy and physiology in order to maximize benefits of treatment; total neuro re-ed 15 minutes    Manual Treatments:       Suboccipital release  Manual cervical traction  1st rib mobilization B through breaths  CTJ harmonics  MFR B cervical paraspinals  C1-2 rotational mobs gr III    Total manual 30 minutes       Modalities:      Test/Measurements:     Positional Testing  R Hallpike-[place] maneuver:               Nystagmus:     []? Yes             [x]? No               []? Duration:                                           []? Direction:                  Vertigo:            []? Yes             [x]? No               []? Duration:   L Hallpike-Claire maneuver:              Nystagmus:     []? Yes             [x]? No               []? Duration:                                           []? Direction:                  Vertigo:            []? Yes             [x]? No               []? Duration:   Supine roll head right:              Nystagmus:     []? Yes             [x]? No               []? Duration:                                           []? Direction:                  Vertigo:            []? Yes             [x]? No               []? Duration:   Supine roll head left:              Nystagmus:     []? Yes             [x]?  No               []? Duration: []? Direction:                  Vertigo:            []? Yes             [x]? No               []? Duration:      ASSESSMENT:     Pt was negative for nystagmus and dizziness during positional testing this date - though did have a headache. Pt responded well to manual therapy, reporting improved headache symptoms post treatment. Initiated self mobs for cervical rotation and suboccipital release. Progress to gaze stabilization as pt tolerates. Treatment/Activity Tolerance:   [x]Patient tolerated treatment well [] Patient limited by fatique  []Patient limited by pain [] Patient limited by other medical complications  [] Other:     GOALS: Goals established 2/25/22   Patient stated goal: improve dizziness  []? Progressing: []? Met: []? Not Met: []? Adjusted     Therapist goals for Patient:  Short Term Goals: To be achieved in: 2 weeks  1. Independent in HEP and progression per patient tolerance, in order to prevent re-injury. []? Progressing: []? Met: []? Not Met: []? Adjusted  2. Patient will have a decrease in dizziness/imbalance/symptoms to 25% to facility improvement in movement, function, balance, and ADLS as indicated by Functional Deficits. []? Progressing: []? Met: []? Not Met: []? Adjusted     Long Term Goals: To be achieved in:  4 weeks  1. Disability index score of 25% or less for the [facility] to assist with reaching prior level of function  []? Progressing: []? Met: []? Not Met: []? Adjusted  2. Patient will demonstrate increased cervical AROM to WNL, joint mobility WNL to allow for proper joint functioning as indicated by Functional Deficits. []? Progressing: []? Met: []? Not Met: []? Adjusted  3. Patient will demonstrate negative oculomotor special testing/positional testing as indicated by Functional Deficits. []? Progressing: []? Met: []? Not Met: []? Adjusted  4.  Patient will return to functional activities including lying down, looking up, rolling over without increased symptoms or restriction. []? Progressing: []? Met: []? Not Met: []? Adjusted  5. Patient will complete full work day without onset of symptoms (patient specific functional goal)  []? Progressing: []? Met: []? Not Met: []?  Adjusted     Plan: [x] Continue per plan of care [] Alter current plan (see comments)   [] Plan of care initiated [] Hold pending MD visit [] Discharge      Plan for Next Session:  Vestibular rehab    Re-Certification Due Date:         Signature:  Yulias Floyd, PT

## 2022-03-11 ENCOUNTER — HOSPITAL ENCOUNTER (OUTPATIENT)
Dept: PHYSICAL THERAPY | Age: 22
Setting detail: THERAPIES SERIES
Discharge: HOME | End: 2022-03-11
Payer: COMMERCIAL

## 2022-03-11 PROCEDURE — 97140 MANUAL THERAPY 1/> REGIONS: CPT

## 2022-03-11 PROCEDURE — 97110 THERAPEUTIC EXERCISES: CPT

## 2022-03-11 NOTE — FLOWSHEET NOTE
[facility], [facility], [address]   Phone: [phone]  Fax [phone]    Physical Therapy Daily Treatment Note    Date:  3/11/2022    Patient Name:  Liu Mcdaniels    :  2000  MRN: [id number]  Restrictions/Precautions:    Medical/Treatment Diagnosis Information:  · Diagnosis:   · S09.90XA (ICD-10-CM) - Unspecified injury of head, initial encounter    · S16. 1XXA (ICD-10-CM) - Strain of muscle, fascia and tendon at neck level, initial encounter  Insurance/Certification information:  PT Insurance Information: [facility] - approved 8 visits (2x/week for 4 weeks) from 22 through 3/17/22  Physician Information:  Referring Practitioner: LADARIUS Garces  Plan of care signed (Y/N):  N  Visit# / total visits:  3/8     G-Code (if applicable):          [place] [address]  22    Medicare Cap (if applicable):  n/a = total amount used, updated 3/11/2022    CPT Code # of charges Time In Time Out Total Time   EVAL:   [] (Low) 05873   [] (Medium) 16827   [] (High) 88194       Re-Eval       IN(38631)  2 10:20 10:45 25   NMR (99774)       Manual (50185) 1 10:00 1020 20   TA (28652)       Gait (85813)       E-stim (un) (50615)                  E-stim (attended) (87828)       Ionto       Vaso       Mech Traction (34292)             Other: CRT (88361)       Totals   3 10:00 10:45 45     ________________________________________________________________________________________    Pain Level:   5/10   SUBJECTIVE:  Pt reports that the dizziness continues to be improved, but that her vision and headaches remain about the same. Located primarily in her eyes. OBJECTIVE:     Exercise/Equipment Resistance/Repetitions Other comments          Canalith repositioning for R PSC canalithiasis  Intense vertigo which initially stopped her from lying all the way down. No nystagmus once supine and head hanging. Noticed ear popped in position 3.  Had stabilization exercises despite still waiting approval for neuro-opthamology assessment. Increased visual symptoms within a few seconds each repetition. Progress as pt tolerates. Treatment/Activity Tolerance:   [x]Patient tolerated treatment well [] Patient limited by fatique  []Patient limited by pain [] Patient limited by other medical complications  [] Other:     GOALS: Goals established 2/25/22   Patient stated goal: improve dizziness  []? Progressing: []? Met: []? Not Met: []? Adjusted     Therapist goals for Patient:  Short Term Goals: To be achieved in: 2 weeks  1. Independent in HEP and progression per patient tolerance, in order to prevent re-injury. []? Progressing: []? Met: []? Not Met: []? Adjusted  2. Patient will have a decrease in dizziness/imbalance/symptoms to 25% to facility improvement in movement, function, balance, and ADLS as indicated by Functional Deficits. []? Progressing: []? Met: []? Not Met: []? Adjusted     Long Term Goals: To be achieved in:  4 weeks  1. Disability index score of 25% or less for the [facility] to assist with reaching prior level of function  []? Progressing: []? Met: []? Not Met: []? Adjusted  2. Patient will demonstrate increased cervical AROM to WNL, joint mobility WNL to allow for proper joint functioning as indicated by Functional Deficits. []? Progressing: []? Met: []? Not Met: []? Adjusted  3. Patient will demonstrate negative oculomotor special testing/positional testing as indicated by Functional Deficits. []? Progressing: []? Met: []? Not Met: []? Adjusted  4. Patient will return to functional activities including lying down, looking up, rolling over without increased symptoms or restriction. []? Progressing: []? Met: []? Not Met: []? Adjusted  5. Patient will complete full work day without onset of symptoms (patient specific functional goal)  []? Progressing: []? Met: []? Not Met: []?  Adjusted     Plan: [x] Continue per plan of care [] Alter current plan (see comments)   [] Plan of care initiated [] Hold pending MD visit [] Discharge      Plan for Next Session:  Vestibular rehab    Re-Certification Due Date:         Signature:  Varsha Victoria PT

## 2022-03-14 ENCOUNTER — HOSPITAL ENCOUNTER (OUTPATIENT)
Dept: PHYSICAL THERAPY | Age: 22
Setting detail: THERAPIES SERIES
Discharge: HOME | End: 2022-03-14
Payer: COMMERCIAL

## 2022-03-14 PROCEDURE — 97140 MANUAL THERAPY 1/> REGIONS: CPT

## 2022-03-14 PROCEDURE — 97110 THERAPEUTIC EXERCISES: CPT

## 2022-03-14 NOTE — FLOWSHEET NOTE
Nita  80. and Therapy, [facility], [address] [place] [facility], [address]  Phone: [phone]  Fax [phone]    Physical Therapy Daily Treatment Note    Date:  3/14/2022    Patient Name:  Brendon Barker    :  2000  MRN: [id number]  Restrictions/Precautions:    Medical/Treatment Diagnosis Information:  · Diagnosis:   · S09.90XA (ICD-10-CM) - Unspecified injury of head, initial encounter    · S16. 1XXA (ICD-10-CM) - Strain of muscle, fascia and tendon at neck level, initial encounter  Insurance/Certification information:  PT Insurance Information: Adilson Menard - approved 8 visits (2x/week for 4 weeks) from 22 through 3/17/22  Physician Information:  Referring Practitioner: LADARIUS Reynoso  Plan of care signed (Y/N):  N  Visit# / total visits:       G-Code (if applicable):          [place] [address]  22    Medicare Cap (if applicable):  n/a = total amount used, updated 3/14/2022    CPT Code # of charges Time In Time Out Total Time   EVAL:   [] (Low) 04832   [] (Medium) 57260   [] (High) 07662       Re-Eval       CG(56873)  2 8:50 9:15 25   NMR (58087)       Manual (13987) 1 8:30 8:50 20   TA (87490)       Gait (81837)       E-stim (un) (72255)                  E-stim (attended) (12609)       Ionto       Vaso       Mech Traction (83429)             Other: CRT (39221)       Totals   3 8:30 9:15 45     ________________________________________________________________________________________    Pain Level:   7/10 headache  SUBJECTIVE:  Pt reports that her headache returned about an hour after her session last visit. Exercises are fine, but \"they don't really seem to help\". Dizziness has been better. Primary complaint at this time is her headaches. Did not sleep well last night and feels exhausted today.      OBJECTIVE: [id number]    Exercise/Equipment Resistance/Repetitions Other comments          Canalith repositioning for R PSC canalithiasis  Intense vertigo which initially stopped her from lying all the way down. No nystagmus once supine and head hanging. Noticed ear popped in position 3. Had a \"sinking\" feeling upon sitting with increased headache that settled after 60 seconds. Self-snag for C1/2 HEP   Self suboccipital release with tennis balls HEP   Foam roll protocol     - hugs   - shld flex   - punches 3' before, 1' after   10x    10x   10x    Supine DNF   10x5\"   With manual resistance 10x5\"  With rotation 10x B HEP   Standing DNF With towel behind head and shld flex 10x HEP   Wall slides 10x HEP                                                              VORx1   HEP            Other Therapeutic Activities:      Manual Treatments:       Suboccipital release  Manual cervical traction  1st rib mobilization B through breaths  CTJ harmonics  MFR B cervical paraspinals  C1-2 rotational mobs gr III    Total manual 20 minutes       Modalities:      Test/Measurements:     Positional Testing  R Hallpike-[place] maneuver:               Nystagmus:     []? Yes             [x]? No               []? Duration:                                           []? Direction:                  Vertigo:            []? Yes             [x]? No               []? Duration:   L Hallpike-Claire maneuver:              Nystagmus:     []? Yes             [x]? No               []? Duration:                                           []? Direction:                  Vertigo:            []? Yes             [x]? No               []? Duration:   Supine roll head right:              Nystagmus:     []? Yes             [x]? No               []? Duration:                                           []? Direction:                  Vertigo:            []? Yes             [x]? No               []? Duration:   Supine roll head left:              Nystagmus:     []? Yes             [x]?  No               []? Duration:                                           []? Direction:                  Vertigo: []? Yes             [x]? No               []? Duration:      ASSESSMENT:     Pt again responded well to manual therapy, reporting improved headache symptoms post treatment. Emphasized cervical stabilization this date and included in home exercise program. Progress as pt tolerates. Treatment/Activity Tolerance:   [x]Patient tolerated treatment well [] Patient limited by fatique  []Patient limited by pain [] Patient limited by other medical complications  [] Other:     GOALS: Goals established 2/25/22   Patient stated goal: improve dizziness  []? Progressing: []? Met: []? Not Met: []? Adjusted     Therapist goals for Patient:  Short Term Goals: To be achieved in: 2 weeks  1. Independent in HEP and progression per patient tolerance, in order to prevent re-injury. []? Progressing: []? Met: []? Not Met: []? Adjusted  2. Patient will have a decrease in dizziness/imbalance/symptoms to 25% to facility improvement in movement, function, balance, and ADLS as indicated by Functional Deficits. []? Progressing: []? Met: []? Not Met: []? Adjusted     Long Term Goals: To be achieved in:  4 weeks  1. Disability index score of 25% or less for the [facility] to assist with reaching prior level of function  []? Progressing: []? Met: []? Not Met: []? Adjusted  2. Patient will demonstrate increased cervical AROM to WNL, joint mobility WNL to allow for proper joint functioning as indicated by Functional Deficits. []? Progressing: []? Met: []? Not Met: []? Adjusted  3. Patient will demonstrate negative oculomotor special testing/positional testing as indicated by Functional Deficits. []? Progressing: []? Met: []? Not Met: []? Adjusted  4. Patient will return to functional activities including lying down, looking up, rolling over without increased symptoms or restriction. []? Progressing: []? Met: []? Not Met: []? Adjusted  5. Patient will complete full work day without onset of symptoms (patient specific functional goal)  []? Progressing: []? Met: []? Not Met: []?  Adjusted     Plan: [x] Continue per plan of care [] Alter current plan (see comments)   [] Plan of care initiated [] Hold pending MD visit [] Discharge      Plan for Next Session:  Vestibular rehab    Re-Certification Due Date:         Signature:  Ahsley Johnson PT

## 2022-03-18 ENCOUNTER — HOSPITAL ENCOUNTER (OUTPATIENT)
Dept: PHYSICAL THERAPY | Age: 22
Setting detail: THERAPIES SERIES
Discharge: HOME | End: 2022-03-18
Payer: COMMERCIAL

## 2022-03-18 PROCEDURE — 97110 THERAPEUTIC EXERCISES: CPT

## 2022-03-18 PROCEDURE — 97140 MANUAL THERAPY 1/> REGIONS: CPT

## 2022-03-18 NOTE — FLOWSHEET NOTE
[facility], [facility], [facility] [address], [address]   Phone: [phone]  Fax [phone]    Physical Therapy Daily Treatment Note    Date:  3/18/2022    Patient Name:  Don Lopez    :  2000  MRN: [id number]  Restrictions/Precautions:    Medical/Treatment Diagnosis Information:  · Diagnosis:   · S09.90XA (ICD-10-CM) - Unspecified injury of head, initial encounter    · S16. 1XXA (ICD-10-CM) - Strain of muscle, fascia and tendon at neck level, initial encounter  Insurance/Certification information:  PT Insurance Information: Tracy Layer - approved 8 visits (2x/week for 4 weeks) from 22 through 3/17/22  Physician Information:  Referring Practitioner: LADARIUS Rausch  Plan of care signed (Y/N):  N  Visit# / total visits:       G-Code (if applicable):          [address] [place] [address]  22    Medicare Cap (if applicable):  n/a = total amount used, updated 3/18/2022    CPT Code # of charges Time In Time Out Total Time   EVAL:   [] (Low) 74150   [] (Medium) 08013   [] (High) 45340       Re-Eval       WN(49968)  2 11:00 11:30 30   NMR (55005)       Manual (08317) 1 10:45 11:00 15   TA (69715)       Gait (88888)       E-stim (un) (23134)                  E-stim (attended) (84242)       Ionto       Vaso       Mech Traction (04597)             Other: CRT (21700)       Totals   3 10:45 11:30 45     ________________________________________________________________________________________    Pain Level:   0/10 headache  SUBJECTIVE:  Pt reports that she had a \"blackout episode\" yesterday that she has had in the past. \"My mom calls them seizures, but I've never had them checked out. \" \"When I woke up from it, I felt completely back to normal. My headache was gone. \"     OBJECTIVE: [id number]    Exercise/Equipment Resistance/Repetitions Other comments          Canalith repositioning for R PSC canalithiasis  Intense vertigo which initially stopped her from lying all the way down. No nystagmus once supine and head hanging. Noticed ear popped in position 3. Had a \"sinking\" feeling upon sitting with increased headache that settled after 60 seconds. Self-snag for C1/2 HEP   Self suboccipital release with tennis balls HEP   Foam roll protocol     - hugs   - shld flex   - punches 3' before, 1' after   10x    10x   10x   Supine DNF   HEP   Standing DNF HEP   Wall slides HEP            UBE    2' fwd/2' bkwd    rockerboard 2' rocking, 2' balancing with and without ball toss    FSU and holds on BOSU 20x B    LSU and over on BOSU 20x B    Step ups quick 2x30\"                   VORx1   HEP            Other Therapeutic Activities:      Manual Treatments:       Suboccipital release  Manual cervical traction  1st rib mobilization B through breaths  CTJ harmonics  MFR B cervical paraspinals  C1-2 rotational mobs gr III    Total manual 15 minutes       Modalities:      Test/Measurements:     Positional Testing  R Hallpike-[place] maneuver:               Nystagmus:     []? Yes             [x]? No               []? Duration:                                           []? Direction:                  Vertigo:            []? Yes             [x]? No               []? Duration:   L Hallpike-Claire maneuver:              Nystagmus:     []? Yes             [x]? No               []? Duration:                                           []? Direction:                  Vertigo:            []? Yes             [x]? No               []? Duration:   Supine roll head right:              Nystagmus:     []? Yes             [x]? No               []? Duration:                                           []? Direction:                  Vertigo:            []? Yes             [x]? No               []? Duration:   Supine roll head left:              Nystagmus:     []? Yes             [x]?  No               []? Duration:                                           []? Direction:                  Vertigo: []? Yes             [x]? No               []? Duration:      ASSESSMENT:     Pt reported resolution of all of her concussion symptoms today after having a seizure-like moment yesterday (pre-existing per patient, but not formally diagnosed). She tolerated significant progression in therex and initiating cardio exercise, balance exercise, and combined movement exercise without provocation. Progress as pt tolerates. Treatment/Activity Tolerance:   [x]Patient tolerated treatment well [] Patient limited by fatique  []Patient limited by pain [] Patient limited by other medical complications  [] Other:     GOALS: Goals established 2/25/22   Patient stated goal: improve dizziness  []? Progressing: []? Met: []? Not Met: []? Adjusted     Therapist goals for Patient:  Short Term Goals: To be achieved in: 2 weeks  1. Independent in HEP and progression per patient tolerance, in order to prevent re-injury. []? Progressing: []? Met: []? Not Met: []? Adjusted  2. Patient will have a decrease in dizziness/imbalance/symptoms to 25% to facility improvement in movement, function, balance, and ADLS as indicated by Functional Deficits. []? Progressing: []? Met: []? Not Met: []? Adjusted     Long Term Goals: To be achieved in:  4 weeks  1. Disability index score of 25% or less for the [facility] to assist with reaching prior level of function  []? Progressing: []? Met: []? Not Met: []? Adjusted  2. Patient will demonstrate increased cervical AROM to WNL, joint mobility WNL to allow for proper joint functioning as indicated by Functional Deficits. []? Progressing: []? Met: []? Not Met: []? Adjusted  3. Patient will demonstrate negative oculomotor special testing/positional testing as indicated by Functional Deficits. []? Progressing: []? Met: []? Not Met: []? Adjusted  4. Patient will return to functional activities including lying down, looking up, rolling over without increased symptoms or restriction. []?  Progressing: []? Met: []? Not Met: []? Adjusted  5. Patient will complete full work day without onset of symptoms (patient specific functional goal)  []? Progressing: []? Met: []? Not Met: []?  Adjusted     Plan: [x] Continue per plan of care [] Alter current plan (see comments)   [] Plan of care initiated [] Hold pending MD visit [] Discharge      Plan for Next Session:  Vestibular rehab    Re-Certification Due Date:         Signature:  Orlando Shankar PT

## 2022-03-21 ENCOUNTER — HOSPITAL ENCOUNTER (OUTPATIENT)
Dept: PHYSICAL THERAPY | Age: 22
Setting detail: THERAPIES SERIES
Discharge: HOME | End: 2022-03-21
Payer: COMMERCIAL

## 2022-03-21 PROCEDURE — 97110 THERAPEUTIC EXERCISES: CPT

## 2022-03-21 PROCEDURE — 97140 MANUAL THERAPY 1/> REGIONS: CPT

## 2022-03-21 NOTE — FLOWSHEET NOTE
Nita  80. and Therapy, [facility], [address] [place] [facility], [address]  Phone: [phone]  Fax [phone]    Physical Therapy Daily Treatment Note    Date:  3/21/2022    Patient Name:  Shane Pompa    :  2000  MRN: [id number]  Restrictions/Precautions:    Medical/Treatment Diagnosis Information:  · Diagnosis:   · S09.90XA (ICD-10-CM) - Unspecified injury of head, initial encounter    · S16. 1XXA (ICD-10-CM) - Strain of muscle, fascia and tendon at neck level, initial encounter  Insurance/Certification information:  PT Insurance Information: [employer] - approved 8 visits (2x/week for 4 weeks) from 22 through 3/17/22  Physician Information:  Referring Practitioner: LADARIUS Thrasher  Plan of care signed (Y/N):  N  Visit# / total visits:       G-Code (if applicable):          [address] [place] [address]  22    Medicare Cap (if applicable):  n/a = total amount used, updated 3/21/2022    CPT Code # of charges Time In Time Out Total Time   EVAL:   [] (Low) 24630   [] (Medium) 77924   [] (High) 49927       Re-Eval       ZW(53481)  2 9:30 10:00 30   NMR (56293)       Manual (58190) 1 9:15 9:30 15   TA (57297)       Gait (08756)       E-stim (un) (13790)                  E-stim (attended) (59829)       Ionto       Vaso       Mech Traction (05517)             Other: CRT (59656)       Totals   3 9:15 10:00 45     ________________________________________________________________________________________    Pain Level:   10 headache  SUBJECTIVE:  Pt reports that her headache is back a little, but they seem to be tied directly to her sleep at this point. When she doesn't sleep well, she will have a low-grade headache. When she sleeps well, her headache is gone.      OBJECTIVE: [id number]    Exercise/Equipment Resistance/Repetitions Other comments          Canalith repositioning for R PSC canalithiasis  Intense vertigo which initially stopped her from lying all the way down. No nystagmus once supine and head hanging. Noticed ear popped in position 3. Had a \"sinking\" feeling upon sitting with increased headache that settled after 60 seconds. Self-snag for C1/2 HEP   Self suboccipital release with tennis balls HEP   Foam roll protocol     - gs   - ld flex   - punches 3' before, 1' after   10x    10x   10x   Supine DNF   HEP   Standing DNF HEP   Wall slides HEP            UBE    2' fwd/2' bkwd    rockerboard 2' rocking, 2' balancing with and without ball toss    FSU and holds on BOSU 20x B    LSU and over on BOSU 20x B    Step ups quick 2x30\"     Minisquat on BOSU   15x    Romberg on airex EC up/down x30  EC side/side x30    Romberg on airex ball toss x2 mins    Walking with ball toss x3 mins Increased dizziness and \"warmth\"               VORx1   HEP            Other Therapeutic Activities:      Manual Treatments:       Suboccipital release  Manual cervical traction  1st rib mobilization B through breaths  CTJ harmonics  MFR B cervical paraspinals  C1-2 rotational mobs gr III    Total manual 15 minutes       Modalities:      Test/Measurements:     Positional Testing  R Hallpike-Claire maneuver:               Nystagmus:     []? Yes             [x]? No               []? Duration:                                           []? Direction:                  Vertigo:            []? Yes             [x]? No               []? Duration:   L Hallpike-Claire maneuver:              Nystagmus:     []? Yes             [x]? No               []? Duration:                                           []? Direction:                  Vertigo:            []? Yes             [x]? No               []? Duration:   Supine roll head right:              Nystagmus:     []? Yes             [x]? No               []? Duration:                                           []? Direction:                  Vertigo:            []? Yes             [x]?  No               []? Duration:   Supine roll head left: Nystagmus:     []? Yes             [x]? No               []? Duration:                                           []? Direction:                  Vertigo:            []? Yes             [x]? No               []? Duration:      ASSESSMENT:     Pt tolerated treatment well until end of session in which she was doing walking with ball toss in the hallways - she had about 2 minutes of dizziness and \"warmth\" that dissipated with rest. Otherwise, continues to tolerate progression of post-concussive treatment well with minimal restrictions. Progress as pt tolerates. Treatment/Activity Tolerance:   [x]Patient tolerated treatment well [] Patient limited by fatique  []Patient limited by pain [] Patient limited by other medical complications  [] Other:     GOALS: Goals established 2/25/22   Patient stated goal: improve dizziness  []? Progressing: []? Met: []? Not Met: []? Adjusted     Therapist goals for Patient:  Short Term Goals: To be achieved in: 2 weeks  1. Independent in HEP and progression per patient tolerance, in order to prevent re-injury. []? Progressing: []? Met: []? Not Met: []? Adjusted  2. Patient will have a decrease in dizziness/imbalance/symptoms to 25% to facility improvement in movement, function, balance, and ADLS as indicated by Functional Deficits. []? Progressing: []? Met: []? Not Met: []? Adjusted     Long Term Goals: To be achieved in:  4 weeks  1. Disability index score of 25% or less for the [facility] to assist with reaching prior level of function  []? Progressing: []? Met: []? Not Met: []? Adjusted  2. Patient will demonstrate increased cervical AROM to WNL, joint mobility WNL to allow for proper joint functioning as indicated by Functional Deficits. []? Progressing: []? Met: []? Not Met: []? Adjusted  3. Patient will demonstrate negative oculomotor special testing/positional testing as indicated by Functional Deficits. []? Progressing: []? Met: []? Not Met: []? Adjusted  4.  Patient will return to functional activities including lying down, looking up, rolling over without increased symptoms or restriction. []? Progressing: []? Met: []? Not Met: []? Adjusted  5. Patient will complete full work day without onset of symptoms (patient specific functional goal)  []? Progressing: []? Met: []? Not Met: []?  Adjusted     Plan: [x] Continue per plan of care [] Alter current plan (see comments)   [] Plan of care initiated [] Hold pending MD visit [] Discharge      Plan for Next Session:  Vestibular rehab    Re-Certification Due Date:         Signature:  Joe Howard PT

## 2022-03-25 ENCOUNTER — HOSPITAL ENCOUNTER (OUTPATIENT)
Dept: PHYSICAL THERAPY | Age: 22
Setting detail: THERAPIES SERIES
Discharge: HOME | End: 2022-03-25
Payer: COMMERCIAL

## 2022-03-25 PROCEDURE — 97140 MANUAL THERAPY 1/> REGIONS: CPT

## 2022-03-25 PROCEDURE — 97110 THERAPEUTIC EXERCISES: CPT

## 2022-03-25 NOTE — PROGRESS NOTES
Nita  80. and Therapy, [facility], [address] [place] [facility], [address]  Phone: [phone]  Fax [phone]    Physical Therapy Daily Treatment/Progress Note    Date:  3/25/2022    Patient Name:  Geovanni Ruby    :  2000  MRN: [id number]  Restrictions/Precautions:    Medical/Treatment Diagnosis Information:  · Diagnosis:   · S09.90XA (ICD-10-CM) - Unspecified injury of head, initial encounter    · S16. 1XXA (ICD-10-CM) - Strain of muscle, fascia and tendon at neck level, initial encounter  Insurance/Certification information:  PT Insurance Information: [employer] - approved 8 visits (2x/week for 4 weeks) from 22 through 3/25/22  Physician Information:  Referring Practitioner: LADARIUS Marvin  Plan of care signed (Y/N):  N  Visit# / total visits:       G-Code (if applicable):          [address] [place] [address]  50  22    8  3/25/22      Medicare Cap (if applicable):  n/a = total amount used, updated 3/25/2022    CPT Code # of charges Time In Time Out Total Time   EVAL:   [] (Low) 81928   [] (Medium) 26245   [] (High) 80335       Re-Eval       SN(88944)  2 11:00 11:30 30   NMR (46958)       Manual (31021) 1 10:45 11:00 15   TA (34855)       Gait (69463)       E-stim (un) (54849)                  E-stim (attended) (76089)       Ionto       Vaso       Mech Traction (23707)             Other: CRT (98160)       Totals   3 10:45 11:30 45     ________________________________________________________________________________________    Pain Level:   4/10 headache  SUBJECTIVE:  Pt reports that her headache that brought her to therapy is gone. She continues to get end of day headaches that are \"like a band around my head\". Ibuprofen helps. 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      OBJECTIVE: [id number]    Exercise/Equipment Resistance/Repetitions Other comments          Canalith repositioning for R PSC canalithiasis  Intense vertigo which initially stopped her from lying all the way down. No nystagmus once supine and head hanging. Noticed ear popped in position 3. Had a \"sinking\" feeling upon sitting with increased headache that settled after 60 seconds. Self-snag for C1/2 HEP   Self suboccipital release with tennis balls HEP   Foam roll protocol     - hugs   - shld flex   - punches    Supine DNF   HEP   Standing DNF HEP   Wall slides HEP            UBE    2' fwd/2' bkwd    rockerboard 2' rocking, 2' balancing with and without ball toss    FSU and holds on BOSU 20x B    LSU and over on BOSU 20x B    Step ups quick 2x30\"     Minisquat on BOSU   15x    Romberg on airex EC up/down x30  EC side/side x30    Romberg on airex ball toss x2 mins    Walking with ball toss x3 mins                VORx1   HEP            Other Therapeutic Activities:      Manual Treatments:       Suboccipital release  Manual cervical traction  1st rib mobilization B through breaths  CTJ harmonics  MFR B cervical paraspinals  C1-2 rotational mobs gr III    Total manual 15 minutes       Modalities:      Test/Measurements:     Musculoskeletal Screen  Cervical spine complaints:    Cervical Pain: 0/10 (At eval: 2/10 currently)  Cervical spine ROM:               [x]?  WNL          []? Impaired: (at eval: decreased rotation B 25%)     LE ROM:     RIGHT LEFT    Hip [facility]    Knee [facility]    Ankle [facility]      LE Strength:    RIGHT LEFT    Hip flexion [facility]    Hip IR [facility]    Hip ER [facility]    Knee extension [facility]    Knee flexion [facility]    Ankle [facility]      Posture:  Slight forward head     Somatosensory:  Light touch:                 [x]? Normal        []? Impaired     Comments:     Coordination:  Rapid alternating movements:            [x]? Normal        []? Dysdiadokinesia       Finger to Nose:                                   [x]? Normal        []?  Dysmetric  Heel to Shin: [x]? Normal        []? Ataxic     Postural Control Tests:  Clinical Test of Sensory Interaction for Balance (CTSIB) performed in Romberg stance  CONDITION TIME STRATEGY SWAY    Eyes open, firm surface 30 ankle min    Eyes closed, firm surface 30 ankle min    Eyes open, foam surface 30 ankle min    Eyes closed, foam surface 30 Ankle mod      Tandem stance: WNL     Gait:               Assistive Device: N                                                     Orthosis: N              At self-initiated pace:  Mary: WFL                        SUNNY: WFL                                                                     Arm swing: Y                           Head/trunk rotation: Y                                                  Straight path: Y                       Swerves: N                                                  Staggers: N                             Side-steps: N              Gait speed: WFL     Oculomotor/Vestibular Examination: Pt's eyes do not look straight, L eye appears to be turned in slightly compared to R. They move together in each direction, but L eye always seems slightly inward. This remains from evaluation and has yet to be evaluated by opthamologist     Spontaneous nystagmus:       []? Left              []? Right           [x]? Absent  Gaze-Evoked nystagmus with fixation present:              Primary            []? Present       [x]? Absent              Right                []? Present       [x]? Absent              Left                  []? Present       [x]? Absent  VOR Head Thrust Test:          [x]? Normal        []? Abnormal    Comments: (At eval: delayed B, non consistently)  VOR Cancellation:                  [x]? Normal        []? Abnormal    Comments:   Smooth Pursuit:                      [x]? Normal        []? Abnormal    Comments: (At eval: saccadic intrusions)  Saccades:                               [x]? Normal        []?  Abnormal    Comments: Convergence:                          []? Normal        [x]? Abnormal    Comments:  15 cm (At eval: 20 cm from nose)  Test of skew                            [x]? Normal        []? Abnormal    Comments:      Positional Testing  R Hallpike-Sterling maneuver:               Nystagmus:     []? Yes             [x]? No               []? Duration:                                           []? Direction:                  Vertigo:            []? Yes             [x]? No               []? Duration:   L Hallpike-Sterling maneuver:              Nystagmus:     []? Yes             [x]? No               []? Duration:                                           []? Direction:                  Vertigo:            []? Yes             [x]? No               []? Duration:   Supine roll head right:              Nystagmus:     []? Yes             [x]? No               []? Duration:                                           []? Direction:                  Vertigo:            []? Yes             [x]? No               []? Duration:   Supine roll head left:              Nystagmus:     []? Yes             [x]? No               []? Duration:                                           []? Direction:                  Vertigo:            []? Yes             [x]? No               []? Duration:      ASSESSMENT:     After 7 PT visits, pt is demonstrating significant improvement in vestibular rehab. She no longer has dizziness and her headache has returned to her baseline migraine headache that she would normally get end of day. The constant headache by her eyes has dissipated. She also notes that she is sleeping better (sometimes too good) which may be helping. She has met all PT goals. She continues to demonstrate a different in the position of her two eyes, and continue to recommend that patient have this assessed. Pt verbalizes understanding and is agreeable to d/c this date.      Treatment/Activity Tolerance:   [x]Patient tolerated treatment well [] Patient limited by fatique  []Patient limited by pain [] Patient limited by other medical complications  [] Other:     GOALS: Goals established 2/25/22   Patient stated goal: improve dizziness  []? Progressing: []? Met: []? Not Met: []? Adjusted     Therapist goals for Patient:  Short Term Goals: To be achieved in: 2 weeks  1. Independent in HEP and progression per patient tolerance, in order to prevent re-injury. []? Progressing: [x]? Met: []? Not Met: []? Adjusted  2. Patient will have a decrease in dizziness/imbalance/symptoms to 25% to facility improvement in movement, function, balance, and ADLS as indicated by Functional Deficits. []? Progressing: [x]? Met: []? Not Met: []? Adjusted     Long Term Goals: To be achieved in:  4 weeks  1. Disability index score of 25% or less for the [facility] to assist with reaching prior level of function  []? Progressing: [x]? Met: []? Not Met: []? Adjusted  2. Patient will demonstrate increased cervical AROM to WNL, joint mobility WNL to allow for proper joint functioning as indicated by Functional Deficits. []? Progressing: [x]? Met: []? Not Met: []? Adjusted  3. Patient will demonstrate negative oculomotor special testing/positional testing as indicated by Functional Deficits. [x]? Progressing: []? Met: []? Not Met: []? Adjusted  4. Patient will return to functional activities including lying down, looking up, rolling over without increased symptoms or restriction. []? Progressing: [x]? Met: []? Not Met: []? Adjusted  5. Patient will complete full work day without onset of symptoms (patient specific functional goal)  []? Progressing: [x]? Met: []? Not Met: []?  Adjusted     Plan: [] Continue per plan of care [] Alter current plan (see comments)   [] Plan of care initiated [] Hold pending MD visit [x] Discharge      Plan for Next Session:  Vestibular rehab    Re-Certification Due Date:         Signature:  Vielka Kam, PT

## 2023-04-25 PROCEDURE — 99283 EMERGENCY DEPT VISIT LOW MDM: CPT

## 2023-04-26 ENCOUNTER — HOSPITAL ENCOUNTER (EMERGENCY)
Age: 23
Discharge: HOME | End: 2023-04-26

## 2023-04-26 VITALS
OXYGEN SATURATION: 99 % | BODY MASS INDEX: 31.65 KG/M2 | DIASTOLIC BLOOD PRESSURE: 90 MMHG | HEIGHT: 62 IN | RESPIRATION RATE: 18 BRPM | SYSTOLIC BLOOD PRESSURE: 133 MMHG | TEMPERATURE: 98.3 F | HEART RATE: 73 BPM | WEIGHT: 172 LBS

## 2023-04-26 DIAGNOSIS — L73.9 FOLLICULITIS: Primary | ICD-10-CM

## 2023-04-26 DIAGNOSIS — L30.9 DERMATITIS: ICD-10-CM

## 2023-04-26 PROCEDURE — 6370000000 HC RX 637 (ALT 250 FOR IP): Performed by: NURSE PRACTITIONER

## 2023-04-26 RX ORDER — CLINDAMYCIN HYDROCHLORIDE 150 MG/1
450 CAPSULE ORAL 3 TIMES DAILY
Qty: 90 CAPSULE | Refills: 0 | Status: SHIPPED | OUTPATIENT
Start: 2023-04-26 | End: 2023-05-06

## 2023-04-26 RX ORDER — CLINDAMYCIN HYDROCHLORIDE 150 MG/1
450 CAPSULE ORAL ONCE
Status: COMPLETED | OUTPATIENT
Start: 2023-04-26 | End: 2023-04-26

## 2023-04-26 RX ADMIN — CLINDAMYCIN HYDROCHLORIDE 450 MG: 150 CAPSULE ORAL at 00:37

## 2023-04-26 ASSESSMENT — PAIN - FUNCTIONAL ASSESSMENT: PAIN_FUNCTIONAL_ASSESSMENT: 0-10

## 2023-04-26 ASSESSMENT — ENCOUNTER SYMPTOMS
COLOR CHANGE: 0
SORE THROAT: 0
SHORTNESS OF BREATH: 0
FACIAL SWELLING: 0
RHINORRHEA: 0
ABDOMINAL PAIN: 0

## 2023-04-26 ASSESSMENT — PAIN SCALES - GENERAL: PAINLEVEL_OUTOF10: 6

## 2023-04-26 ASSESSMENT — LIFESTYLE VARIABLES
HOW OFTEN DO YOU HAVE A DRINK CONTAINING ALCOHOL: MONTHLY OR LESS
HOW MANY STANDARD DRINKS CONTAINING ALCOHOL DO YOU HAVE ON A TYPICAL DAY: 1 OR 2

## 2023-04-26 NOTE — ED TRIAGE NOTES
Patient comes to ER with complaints of rash that appeared on neck, left upper arm and chest. Started on the neck and progressed to other areas. She reports that spots are leaking fluid, denies injury or being scratched/bit by anything. She did have fever yesterday but denies fevers today. Afebrile in triage. She reports she just got over COVID 2 weeks ago, also had strep. 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

## 2025-01-14 ENCOUNTER — HOSPITAL ENCOUNTER (EMERGENCY)
Age: 25
Discharge: HOME | End: 2025-01-15
Attending: EMERGENCY MEDICINE
Payer: COMMERCIAL

## 2025-01-14 ENCOUNTER — APPOINTMENT (OUTPATIENT)
Dept: CT IMAGING | Age: 25
End: 2025-01-14
Payer: COMMERCIAL

## 2025-01-14 DIAGNOSIS — R10.9 RIGHT FLANK PAIN: ICD-10-CM

## 2025-01-14 DIAGNOSIS — T14.8XXA MUSCULOSKELETAL STRAIN: ICD-10-CM

## 2025-01-14 DIAGNOSIS — K52.9 GASTROENTERITIS: Primary | ICD-10-CM

## 2025-01-14 LAB
ALBUMIN SERPL-MCNC: 4.2 G/DL (ref 3.4–5)
ALBUMIN/GLOB SERPL: 2.1 {RATIO} (ref 1.1–2.2)
ALP SERPL-CCNC: 84 U/L (ref 40–129)
ALT SERPL-CCNC: 22 U/L (ref 10–40)
ANION GAP SERPL CALCULATED.3IONS-SCNC: 9 MMOL/L (ref 3–16)
AST SERPL-CCNC: 25 U/L (ref 15–37)
BASOPHILS # BLD: 0.1 K/UL (ref 0–0.2)
BASOPHILS NFR BLD: 0.9 %
BILIRUB SERPL-MCNC: 0.5 MG/DL (ref 0–1)
BILIRUB UR QL STRIP.AUTO: NEGATIVE
BUN SERPL-MCNC: 10 MG/DL (ref 7–20)
CALCIUM SERPL-MCNC: 8.9 MG/DL (ref 8.3–10.6)
CHLORIDE SERPL-SCNC: 102 MMOL/L (ref 99–110)
CLARITY UR: CLEAR
CO2 SERPL-SCNC: 26 MMOL/L (ref 21–32)
COLOR UR: YELLOW
CREAT SERPL-MCNC: 0.7 MG/DL (ref 0.6–1.1)
DEPRECATED RDW RBC AUTO: 13 % (ref 12.4–15.4)
EOSINOPHIL # BLD: 0.2 K/UL (ref 0–0.6)
EOSINOPHIL NFR BLD: 1.8 %
GFR SERPLBLD CREATININE-BSD FMLA CKD-EPI: >90 ML/MIN/{1.73_M2}
GLUCOSE SERPL-MCNC: 78 MG/DL (ref 70–99)
GLUCOSE UR STRIP.AUTO-MCNC: NEGATIVE MG/DL
HCG UR QL: NEGATIVE
HCT VFR BLD AUTO: 38.7 % (ref 36–48)
HGB BLD-MCNC: 13.9 G/DL (ref 12–16)
HGB UR QL STRIP.AUTO: NEGATIVE
KETONES UR STRIP.AUTO-MCNC: NEGATIVE MG/DL
LEUKOCYTE ESTERASE UR QL STRIP.AUTO: NEGATIVE
LIPASE SERPL-CCNC: 30 U/L (ref 13–60)
LYMPHOCYTES # BLD: 3 K/UL (ref 1–5.1)
LYMPHOCYTES NFR BLD: 28.5 %
MCH RBC QN AUTO: 32.6 PG (ref 26–34)
MCHC RBC AUTO-ENTMCNC: 35.9 G/DL (ref 31–36)
MCV RBC AUTO: 90.9 FL (ref 80–100)
MONOCYTES # BLD: 1 K/UL (ref 0–1.3)
MONOCYTES NFR BLD: 10 %
NEUTROPHILS # BLD: 6.1 K/UL (ref 1.7–7.7)
NEUTROPHILS NFR BLD: 58.8 %
NITRITE UR QL STRIP.AUTO: NEGATIVE
PH UR STRIP.AUTO: 6.5 [PH] (ref 5–8)
PLATELET # BLD AUTO: 301 K/UL (ref 135–450)
PMV BLD AUTO: 7.3 FL (ref 5–10.5)
POTASSIUM SERPL-SCNC: 4 MMOL/L (ref 3.5–5.1)
PROT SERPL-MCNC: 6.2 G/DL (ref 6.4–8.2)
PROT UR STRIP.AUTO-MCNC: NEGATIVE MG/DL
RBC # BLD AUTO: 4.26 M/UL (ref 4–5.2)
SODIUM SERPL-SCNC: 137 MMOL/L (ref 136–145)
SP GR UR STRIP.AUTO: 1.02 (ref 1–1.03)
UA COMPLETE W REFLEX CULTURE PNL UR: NORMAL
UA DIPSTICK W REFLEX MICRO PNL UR: NORMAL
URN SPEC COLLECT METH UR: NORMAL
UROBILINOGEN UR STRIP-ACNC: 1 E.U./DL
WBC # BLD AUTO: 10.4 K/UL (ref 4–11)

## 2025-01-14 PROCEDURE — 81003 URINALYSIS AUTO W/O SCOPE: CPT

## 2025-01-14 PROCEDURE — 6360000002 HC RX W HCPCS: Performed by: EMERGENCY MEDICINE

## 2025-01-14 PROCEDURE — 2580000003 HC RX 258: Performed by: EMERGENCY MEDICINE

## 2025-01-14 PROCEDURE — 96374 THER/PROPH/DIAG INJ IV PUSH: CPT

## 2025-01-14 PROCEDURE — 99284 EMERGENCY DEPT VISIT MOD MDM: CPT

## 2025-01-14 PROCEDURE — 74176 CT ABD & PELVIS W/O CONTRAST: CPT

## 2025-01-14 PROCEDURE — 84703 CHORIONIC GONADOTROPIN ASSAY: CPT

## 2025-01-14 PROCEDURE — 2500000003 HC RX 250 WO HCPCS: Performed by: EMERGENCY MEDICINE

## 2025-01-14 PROCEDURE — 80053 COMPREHEN METABOLIC PANEL: CPT

## 2025-01-14 PROCEDURE — 83690 ASSAY OF LIPASE: CPT

## 2025-01-14 PROCEDURE — 85025 COMPLETE CBC W/AUTO DIFF WBC: CPT

## 2025-01-14 PROCEDURE — 96375 TX/PRO/DX INJ NEW DRUG ADDON: CPT

## 2025-01-14 RX ORDER — 0.9 % SODIUM CHLORIDE 0.9 %
1000 INTRAVENOUS SOLUTION INTRAVENOUS ONCE
Status: COMPLETED | OUTPATIENT
Start: 2025-01-14 | End: 2025-01-14

## 2025-01-14 RX ORDER — DIPHENHYDRAMINE HYDROCHLORIDE 50 MG/ML
50 INJECTION INTRAMUSCULAR; INTRAVENOUS ONCE
Status: COMPLETED | OUTPATIENT
Start: 2025-01-14 | End: 2025-01-14

## 2025-01-14 RX ORDER — METOCLOPRAMIDE HYDROCHLORIDE 5 MG/ML
10 INJECTION INTRAMUSCULAR; INTRAVENOUS ONCE
Status: COMPLETED | OUTPATIENT
Start: 2025-01-14 | End: 2025-01-14

## 2025-01-14 RX ORDER — ONDANSETRON 2 MG/ML
4 INJECTION INTRAMUSCULAR; INTRAVENOUS ONCE
Status: COMPLETED | OUTPATIENT
Start: 2025-01-14 | End: 2025-01-14

## 2025-01-14 RX ADMIN — FAMOTIDINE 20 MG: 10 INJECTION, SOLUTION INTRAVENOUS at 23:09

## 2025-01-14 RX ADMIN — METOCLOPRAMIDE 10 MG: 5 INJECTION, SOLUTION INTRAMUSCULAR; INTRAVENOUS at 23:07

## 2025-01-14 RX ADMIN — ONDANSETRON 4 MG: 2 INJECTION INTRAMUSCULAR; INTRAVENOUS at 21:58

## 2025-01-14 RX ADMIN — DIPHENHYDRAMINE HYDROCHLORIDE 50 MG: 50 INJECTION INTRAMUSCULAR; INTRAVENOUS at 23:08

## 2025-01-14 RX ADMIN — HYDROMORPHONE HYDROCHLORIDE 1 MG: 1 INJECTION, SOLUTION INTRAMUSCULAR; INTRAVENOUS; SUBCUTANEOUS at 21:58

## 2025-01-14 RX ADMIN — SODIUM CHLORIDE 1000 ML: 9 INJECTION, SOLUTION INTRAVENOUS at 21:57

## 2025-01-14 ASSESSMENT — PAIN DESCRIPTION - FREQUENCY: FREQUENCY: CONTINUOUS

## 2025-01-14 ASSESSMENT — PAIN DESCRIPTION - ONSET: ONSET: ON-GOING

## 2025-01-14 ASSESSMENT — LIFESTYLE VARIABLES
HOW OFTEN DO YOU HAVE A DRINK CONTAINING ALCOHOL: NEVER
HOW MANY STANDARD DRINKS CONTAINING ALCOHOL DO YOU HAVE ON A TYPICAL DAY: PATIENT DOES NOT DRINK
HOW OFTEN DO YOU HAVE A DRINK CONTAINING ALCOHOL: NEVER

## 2025-01-14 ASSESSMENT — PAIN DESCRIPTION - LOCATION
LOCATION: ABDOMEN
LOCATION: FLANK

## 2025-01-14 ASSESSMENT — PAIN SCALES - GENERAL
PAINLEVEL_OUTOF10: 7
PAINLEVEL_OUTOF10: 8

## 2025-01-14 ASSESSMENT — PAIN DESCRIPTION - ORIENTATION: ORIENTATION: RIGHT

## 2025-01-14 ASSESSMENT — PAIN DESCRIPTION - DESCRIPTORS
DESCRIPTORS: DISCOMFORT
DESCRIPTORS: STABBING;SHARP;SHOOTING

## 2025-01-14 ASSESSMENT — PAIN DESCRIPTION - PAIN TYPE: TYPE: ACUTE PAIN

## 2025-01-14 ASSESSMENT — PAIN - FUNCTIONAL ASSESSMENT: PAIN_FUNCTIONAL_ASSESSMENT: 0-10

## 2025-01-15 ENCOUNTER — HOSPITAL ENCOUNTER (EMERGENCY)
Age: 25
Discharge: HOME | End: 2025-01-15
Attending: STUDENT IN AN ORGANIZED HEALTH CARE EDUCATION/TRAINING PROGRAM
Payer: COMMERCIAL

## 2025-01-15 VITALS
OXYGEN SATURATION: 99 % | HEIGHT: 62 IN | HEART RATE: 70 BPM | WEIGHT: 177.2 LBS | SYSTOLIC BLOOD PRESSURE: 111 MMHG | DIASTOLIC BLOOD PRESSURE: 95 MMHG | RESPIRATION RATE: 16 BRPM | TEMPERATURE: 98.8 F | BODY MASS INDEX: 32.61 KG/M2

## 2025-01-15 VITALS
WEIGHT: 175 LBS | SYSTOLIC BLOOD PRESSURE: 107 MMHG | BODY MASS INDEX: 32.2 KG/M2 | DIASTOLIC BLOOD PRESSURE: 75 MMHG | TEMPERATURE: 98.3 F | RESPIRATION RATE: 16 BRPM | HEART RATE: 83 BPM | HEIGHT: 62 IN | OXYGEN SATURATION: 97 %

## 2025-01-15 DIAGNOSIS — R11.2 NAUSEA AND VOMITING, UNSPECIFIED VOMITING TYPE: Primary | ICD-10-CM

## 2025-01-15 DIAGNOSIS — R10.9 RIGHT FLANK PAIN: ICD-10-CM

## 2025-01-15 LAB
ALBUMIN SERPL-MCNC: 3.9 G/DL (ref 3.4–5)
ALBUMIN/GLOB SERPL: 2 {RATIO} (ref 1.1–2.2)
ALP SERPL-CCNC: 77 U/L (ref 40–129)
ALT SERPL-CCNC: 19 U/L (ref 10–40)
AMPHETAMINES UR QL SCN>1000 NG/ML: NORMAL
ANION GAP SERPL CALCULATED.3IONS-SCNC: 8 MMOL/L (ref 3–16)
AST SERPL-CCNC: 19 U/L (ref 15–37)
BARBITURATES UR QL SCN>200 NG/ML: NORMAL
BASOPHILS # BLD: 0.1 K/UL (ref 0–0.2)
BASOPHILS NFR BLD: 0.6 %
BENZODIAZ UR QL SCN>200 NG/ML: NORMAL
BILIRUB SERPL-MCNC: 0.5 MG/DL (ref 0–1)
BILIRUB UR QL STRIP.AUTO: NEGATIVE
BUN SERPL-MCNC: 7 MG/DL (ref 7–20)
CALCIUM SERPL-MCNC: 8.7 MG/DL (ref 8.3–10.6)
CANNABINOIDS UR QL SCN>50 NG/ML: NORMAL
CHLORIDE SERPL-SCNC: 107 MMOL/L (ref 99–110)
CLARITY UR: CLEAR
CO2 SERPL-SCNC: 25 MMOL/L (ref 21–32)
COCAINE UR QL SCN: NORMAL
COLOR UR: YELLOW
CREAT SERPL-MCNC: 0.6 MG/DL (ref 0.6–1.1)
DEPRECATED RDW RBC AUTO: 12.9 % (ref 12.4–15.4)
DRUG SCREEN COMMENT UR-IMP: NORMAL
EOSINOPHIL # BLD: 0.1 K/UL (ref 0–0.6)
EOSINOPHIL NFR BLD: 1.1 %
FENTANYL SCREEN, URINE: NORMAL
GFR SERPLBLD CREATININE-BSD FMLA CKD-EPI: >90 ML/MIN/{1.73_M2}
GLUCOSE SERPL-MCNC: 85 MG/DL (ref 70–99)
GLUCOSE UR STRIP.AUTO-MCNC: NEGATIVE MG/DL
HCT VFR BLD AUTO: 38.6 % (ref 36–48)
HGB BLD-MCNC: 13.8 G/DL (ref 12–16)
HGB UR QL STRIP.AUTO: NEGATIVE
KETONES UR STRIP.AUTO-MCNC: NEGATIVE MG/DL
LEUKOCYTE ESTERASE UR QL STRIP.AUTO: NEGATIVE
LYMPHOCYTES # BLD: 1.6 K/UL (ref 1–5.1)
LYMPHOCYTES NFR BLD: 18.5 %
MCH RBC QN AUTO: 32.8 PG (ref 26–34)
MCHC RBC AUTO-ENTMCNC: 35.6 G/DL (ref 31–36)
MCV RBC AUTO: 92 FL (ref 80–100)
METHADONE UR QL SCN>300 NG/ML: NORMAL
MONOCYTES # BLD: 0.5 K/UL (ref 0–1.3)
MONOCYTES NFR BLD: 5.9 %
NEUTROPHILS # BLD: 6.3 K/UL (ref 1.7–7.7)
NEUTROPHILS NFR BLD: 73.9 %
NITRITE UR QL STRIP.AUTO: NEGATIVE
OPIATES UR QL SCN>300 NG/ML: NORMAL
OXYCODONE UR QL SCN: NORMAL
PCP UR QL SCN>25 NG/ML: NORMAL
PH UR STRIP.AUTO: 6 [PH] (ref 5–8)
PH UR STRIP: 6 [PH]
PLATELET # BLD AUTO: 283 K/UL (ref 135–450)
PMV BLD AUTO: 7.2 FL (ref 5–10.5)
POTASSIUM SERPL-SCNC: 4.3 MMOL/L (ref 3.5–5.1)
PROT SERPL-MCNC: 5.9 G/DL (ref 6.4–8.2)
PROT UR STRIP.AUTO-MCNC: NEGATIVE MG/DL
RBC # BLD AUTO: 4.2 M/UL (ref 4–5.2)
SODIUM SERPL-SCNC: 140 MMOL/L (ref 136–145)
SP GR UR STRIP.AUTO: <=1.005 (ref 1–1.03)
UA DIPSTICK W REFLEX MICRO PNL UR: NORMAL
URN SPEC COLLECT METH UR: NORMAL
UROBILINOGEN UR STRIP-ACNC: 0.2 E.U./DL
WBC # BLD AUTO: 8.5 K/UL (ref 4–11)

## 2025-01-15 PROCEDURE — 6370000000 HC RX 637 (ALT 250 FOR IP)

## 2025-01-15 PROCEDURE — 6360000002 HC RX W HCPCS: Performed by: STUDENT IN AN ORGANIZED HEALTH CARE EDUCATION/TRAINING PROGRAM

## 2025-01-15 PROCEDURE — 85025 COMPLETE CBC W/AUTO DIFF WBC: CPT

## 2025-01-15 PROCEDURE — 80053 COMPREHEN METABOLIC PANEL: CPT

## 2025-01-15 PROCEDURE — 99284 EMERGENCY DEPT VISIT MOD MDM: CPT

## 2025-01-15 PROCEDURE — 96374 THER/PROPH/DIAG INJ IV PUSH: CPT

## 2025-01-15 PROCEDURE — 96375 TX/PRO/DX INJ NEW DRUG ADDON: CPT

## 2025-01-15 PROCEDURE — 81003 URINALYSIS AUTO W/O SCOPE: CPT

## 2025-01-15 PROCEDURE — 80307 DRUG TEST PRSMV CHEM ANLYZR: CPT

## 2025-01-15 RX ORDER — DROPERIDOL 2.5 MG/ML
2.5 INJECTION, SOLUTION INTRAMUSCULAR; INTRAVENOUS ONCE
Status: DISCONTINUED | OUTPATIENT
Start: 2025-01-15 | End: 2025-01-15

## 2025-01-15 RX ORDER — FAMOTIDINE 20 MG/1
20 TABLET, FILM COATED ORAL ONCE
Status: COMPLETED | OUTPATIENT
Start: 2025-01-15 | End: 2025-01-15

## 2025-01-15 RX ORDER — HALOPERIDOL 5 MG/ML
2 INJECTION INTRAMUSCULAR ONCE
Status: COMPLETED | OUTPATIENT
Start: 2025-01-15 | End: 2025-01-15

## 2025-01-15 RX ORDER — PROMETHAZINE HYDROCHLORIDE 25 MG/1
25 TABLET ORAL ONCE
Status: COMPLETED | OUTPATIENT
Start: 2025-01-15 | End: 2025-01-15

## 2025-01-15 RX ORDER — PROMETHAZINE HYDROCHLORIDE 25 MG/1
25 TABLET ORAL 4 TIMES DAILY PRN
Qty: 20 TABLET | Refills: 0 | Status: SHIPPED | OUTPATIENT
Start: 2025-01-15 | End: 2025-01-22

## 2025-01-15 RX ORDER — DIPHENHYDRAMINE HYDROCHLORIDE 50 MG/ML
25 INJECTION INTRAMUSCULAR; INTRAVENOUS ONCE
Status: COMPLETED | OUTPATIENT
Start: 2025-01-15 | End: 2025-01-15

## 2025-01-15 RX ORDER — ONDANSETRON 2 MG/ML
4 INJECTION INTRAMUSCULAR; INTRAVENOUS ONCE
Status: COMPLETED | OUTPATIENT
Start: 2025-01-15 | End: 2025-01-15

## 2025-01-15 RX ORDER — KETOROLAC TROMETHAMINE 15 MG/ML
15 INJECTION, SOLUTION INTRAMUSCULAR; INTRAVENOUS ONCE
Status: COMPLETED | OUTPATIENT
Start: 2025-01-15 | End: 2025-01-15

## 2025-01-15 RX ADMIN — HALOPERIDOL LACTATE 2 MG: 5 INJECTION, SOLUTION INTRAMUSCULAR at 18:23

## 2025-01-15 RX ADMIN — PROMETHAZINE HYDROCHLORIDE 25 MG: 25 TABLET ORAL at 15:22

## 2025-01-15 RX ADMIN — DIPHENHYDRAMINE HYDROCHLORIDE 25 MG: 50 INJECTION INTRAMUSCULAR; INTRAVENOUS at 18:23

## 2025-01-15 RX ADMIN — KETOROLAC TROMETHAMINE 15 MG: 15 INJECTION, SOLUTION INTRAMUSCULAR; INTRAVENOUS at 16:36

## 2025-01-15 RX ADMIN — ONDANSETRON 4 MG: 2 INJECTION INTRAMUSCULAR; INTRAVENOUS at 16:36

## 2025-01-15 RX ADMIN — FAMOTIDINE 20 MG: 20 TABLET ORAL at 15:22

## 2025-01-15 ASSESSMENT — PAIN DESCRIPTION - ORIENTATION
ORIENTATION: RIGHT
ORIENTATION: RIGHT

## 2025-01-15 ASSESSMENT — PAIN DESCRIPTION - LOCATION
LOCATION: FLANK
LOCATION: FLANK

## 2025-01-15 ASSESSMENT — LIFESTYLE VARIABLES
HOW OFTEN DO YOU HAVE A DRINK CONTAINING ALCOHOL: NEVER
HOW MANY STANDARD DRINKS CONTAINING ALCOHOL DO YOU HAVE ON A TYPICAL DAY: PATIENT DOES NOT DRINK

## 2025-01-15 ASSESSMENT — ENCOUNTER SYMPTOMS
EYE PAIN: 0
SHORTNESS OF BREATH: 0
BACK PAIN: 0
CONSTIPATION: 0
DIARRHEA: 0
VOMITING: 1
NAUSEA: 1
COUGH: 0
RHINORRHEA: 0
EYE REDNESS: 0
ABDOMINAL PAIN: 1

## 2025-01-15 ASSESSMENT — PAIN SCALES - GENERAL
PAINLEVEL_OUTOF10: 9
PAINLEVEL_OUTOF10: 8

## 2025-01-15 ASSESSMENT — PAIN DESCRIPTION - DESCRIPTORS: DESCRIPTORS: DISCOMFORT

## 2025-01-15 ASSESSMENT — PAIN - FUNCTIONAL ASSESSMENT: PAIN_FUNCTIONAL_ASSESSMENT: 0-10

## 2025-01-15 NOTE — ED PROVIDER NOTES
EMERGENCY DEPARTMENT ENCOUNTER     [facility] EMERGENCY DEPARTMENT     Pt Name: Nadeen Adan   MRN: [id number]   Birthdate 2000   Date of evaluation: 1/14/2025   Provider: Torito Dexter MD   PCP: No primary care provider on file.   Note Started: 9:37 PM EST 1/14/25     CHIEF COMPLAINT     No chief complaint on file.       HISTORY OF PRESENT ILLNESS:          Nadeen Adan is a 24 y.o. female who presents to the ER with right flank right lower quadrant pain and fever up to 103    Earlier today patient had a fever spike to 103 pain in her right flank and her right lower quadrant several episodes of nonbilious nonbloody nausea and vomiting.  No vaginal discharge or dysuria.  No ill contacts no cough shortness of breath or chest pain no recent travel.  She does have lupus and your Ehler Danlos syndrome    Pain is 7 out of 10 no aggravating or alleviating factors constant and crampy no prior history of kidney stone    Nursing Notes were all reviewed and agreed with or any disagreements were addressed in the HPI.     ROS: Positives and Pertinent negatives as per HPI.    PAST MEDICAL HISTORY     Past medical history:  has a past medical history of Anxiety, Depression, PCOS (polycystic ovarian syndrome), and Pre-diabetes.    Past surgical history:  has a past surgical history that includes Tonsillectomy and Achilles tendon surgery (Bilateral).      PHYSICAL EXAM:  ED Triage Vitals   BP Systolic BP Percentile Diastolic BP Percentile Temp Temp src Pulse Resp SpO2   -- -- -- -- -- -- -- --      Height Weight         -- --              Physical Exam  Vitals and nursing note reviewed.   Constitutional:       General: She is in acute distress.      Appearance: She is obese. She is not ill-appearing.   HENT:      Head: Normocephalic and atraumatic.      Right Ear: External ear normal.      Left Ear: External ear normal.      Nose: Nose normal.      Mouth/Throat:      Mouth: Mucous membranes are dry.      Pharynx:

## 2025-01-15 NOTE — ED PROVIDER NOTES
THIS IS MY ALEX SUPERVISORY AND SHARED VISIT NOTE:    I personally saw the patient and made/approved the management plan and take responsibility for the patient management.    Labs Reviewed   COMPREHENSIVE METABOLIC PANEL W/ REFLEX TO MG FOR LOW K - Abnormal; Notable for the following components:       Result Value    Total Protein 5.9 (*)     All other components within normal limits   URINALYSIS   CBC WITH AUTO DIFFERENTIAL   URINE DRUG SCREEN     No orders to display     History: Patient is a 24-year-old female, presenting for second visit for right sided flank pain, nausea and vomiting.  Patient was seen overnight last night and was diagnosed with gastroenteritis.  She had labs and a CT abdomen pelvis at that time which were reassuring and she was sent home however states she did not receive any medications to go home with.    Exam: Patient is resting comfortably and is in no acute distress.  Heart regular rate and rhythm.  Lungs clear to auscultation bilaterally.  Abdomen soft, nondistended, nontender.  Right CVA tenderness.  No left CVA tenderness.    MDM: Patient is a 24-year-old female, presenting with concerns for nausea and vomiting, right-sided flank pain.  Negative workup overnight last night.  Upon arrival in the ED, vitals reassuring.  Patient is in no acute distress.  Abdomen is soft, nondistended, nontender.  She does have some right-sided CVA tenderness however UA is negative for infection or hematuria.  Labs are reassuring.  At this point in time, do not feel that repeat imaging is warranted.  Patient treated with Phenergan, Haldol and Benadryl.  Able to tolerate p.o. intake throughout course of stay in the ED.  Will be sent with prescription for Phenergan.  Given return precautions for the ED.    I, Dr. Roblero, am the primary clinician of record.     1. Nausea and vomiting, unspecified vomiting type    2. Right flank pain      Comment: Please note this report has been produced using speech

## 2025-01-15 NOTE — ED PROVIDER NOTES
[facility] EMERGENCY DEPARTMENT  EMERGENCY DEPARTMENT ENCOUNTER        Pt Name: Nadeen Adan  MRN: [id number]  Birthdate 2000  Date of evaluation: 1/15/2025  Provider: LADARIUS Caldwell  PCP: Alcides Cohn MD  Note Started: 2:58 PM EST 1/15/25       I have seen and evaluated this patient with my supervising physician Dr. Roblero       CHIEF COMPLAINT       Chief Complaint   Patient presents with    Flank Pain    Vomiting     Pt arrives from home with right flank/side pain with emesis. Pt states she was seen yesterday for the same thing.        HISTORY OF PRESENT ILLNESS: 1 or more Elements     History From: Patient    Limitations to history : None    Social Determinants Significantly Affecting Health : None    Chief Complaint: Flank pain and vomiting    Nadeen Adan is a 24 y.o. female who presents to the emergency department for vomiting and right-sided flank pain.  States that this has been ongoing for the last few days.  States that she was here in the emergency room last night evaluated for the same thing, she had blood work and imaging done which all came back as negative.  States that they did not discharge her home with any medications and that she is still having symptoms.  She has a history of Erler's Danlos and lupus, states that she is on daily prednisone and Zofran for this.  States Zofran has not been helping with the nausea or vomiting.  Also states that she has had blood in her urine.  Denies amount emesis, melena, hematochezia.  Denies any changes in her symptoms.  Denies chest pain, cough, shortness of breath.  Denies sick contacts or diarrhea.    Nursing Notes were all reviewed and agreed with or any disagreements were addressed in the HPI.    REVIEW OF SYSTEMS :      Review of Systems    Positives and Pertinent negatives as per HPI.     SURGICAL HISTORY     Past Surgical History:   Procedure Laterality Date    ACHILLES TENDON SURGERY Bilateral     x2    TONSILLECTOMY

## 2025-01-15 NOTE — ED PROVIDER NOTES
[facility] EMERGENCY DEPARTMENT  EMERGENCY DEPARTMENT ENCOUNTER        Pt Name: Nadeen Adan  MRN: [id number]  Birthdate 2000  Date of evaluation: 1/14/2025  Provider: Jill Aldrich DO  PCP: Alcides Cohn MD  Note Started: 10:55 PM EST 1/14/25    CHIEF COMPLAINT      Abdominal Pain    HISTORY OF PRESENT ILLNESS: 1 or more Elements     Chief Complaint   Patient presents with    Abdominal Pain     Right sided abdominal pain x2 hours, reports N/V, fever     History from : Patient and (Sign-out ED Physician)  Limitations to history : None    Nadeen Adan is a 24 y.o. female who presents to the emergency department secondary to concern for abdominal pain. Patient signed out to me from previous physician, pending CT imaging results.  Briefly, patient is presenting with right-sided abdominal pain for the last 2 hours prior to arrival.  She also reports nausea and vomiting and subjective fever. Upon signout, lab and imaging was reviewed which was unremarkable; no anemia, leukocytosis or any significant electrolyte abnormalities.  Pregnancy test negative. Urinalysis negative for hematuria or infection. BUN/creatinine within normal levels.     Past medical history noted below. Aside from what is stated above denies any other symptoms or modifying factors.   reports that she has never smoked. She has been exposed to tobacco smoke. She has never used smokeless tobacco. She reports that she does not use drugs.  Nursing Notes were all reviewed and agreed with or any disagreements addressed in HPI/MDM.  REVIEW OF SYSTEMS :    Review of Systems   Constitutional:  Negative for chills and fever.   HENT:  Negative for congestion and rhinorrhea.    Eyes:  Negative for pain and redness.   Respiratory:  Negative for cough and shortness of breath.    Cardiovascular:  Negative for chest pain and leg swelling.   Gastrointestinal:  Positive for abdominal pain, nausea and vomiting. Negative for constipation and diarrhea.

## 2025-02-19 ENCOUNTER — OFFICE VISIT (OUTPATIENT)
Age: 25
End: 2025-02-19

## 2025-02-19 VITALS
TEMPERATURE: 97.2 F | HEART RATE: 75 BPM | OXYGEN SATURATION: 99 % | SYSTOLIC BLOOD PRESSURE: 116 MMHG | DIASTOLIC BLOOD PRESSURE: 70 MMHG

## 2025-02-19 DIAGNOSIS — R10.9 RIGHT FLANK PAIN: Primary | ICD-10-CM

## 2025-02-19 DIAGNOSIS — R10.11 RUQ ABDOMINAL PAIN: ICD-10-CM

## 2025-02-19 LAB
BILIRUBIN, POC: NORMAL
BLOOD URINE, POC: NORMAL
CLARITY, POC: CLEAR
COLOR, POC: YELLOW
GLUCOSE URINE, POC: NORMAL MG/DL
KETONES, POC: NORMAL MG/DL
LEUKOCYTE EST, POC: NORMAL
NITRITE, POC: NORMAL
PH, POC: 5.5
PROTEIN, POC: 30 MG/DL
SPECIFIC GRAVITY, POC: >=1.03
UROBILINOGEN, POC: 0.2 MG/DL

## 2025-02-19 RX ORDER — FLUTICASONE FUROATE 200 UG/1
1 POWDER RESPIRATORY (INHALATION) DAILY
COMMUNITY

## 2025-02-19 RX ORDER — METHYLPREDNISOLONE 4 MG/1
TABLET ORAL
COMMUNITY

## 2025-02-19 RX ORDER — LIDOCAINE 50 MG/G
1 PATCH TOPICAL DAILY
Qty: 10 PATCH | Refills: 0 | Status: SHIPPED | OUTPATIENT
Start: 2025-02-19 | End: 2025-03-01

## 2025-02-19 RX ORDER — QUETIAPINE FUMARATE 100 MG/1
200 TABLET, FILM COATED ORAL NIGHTLY
COMMUNITY
Start: 2024-09-05

## 2025-02-19 RX ORDER — DULOXETIN HYDROCHLORIDE 20 MG/1
20 CAPSULE, DELAYED RELEASE ORAL
COMMUNITY
Start: 2024-11-20

## 2025-02-19 RX ORDER — OLANZAPINE 5 MG/1
5 TABLET ORAL NIGHTLY
COMMUNITY
Start: 2025-02-12

## 2025-02-19 RX ORDER — HYDROXYCHLOROQUINE SULFATE 200 MG/1
200 TABLET, FILM COATED ORAL 2 TIMES DAILY
COMMUNITY
Start: 2025-02-04

## 2025-02-19 NOTE — PATIENT INSTRUCTIONS
An outpatient ultrasound has been ordered.  Call [phone] to schedule.   Explained you are seen at urgent care and an outpatient ultrasound was ordered.  They will schedule you appropriately.  They typically do not want you to eat for about 4 hours prior to the test.    Lidoderm patches as prescribed    You may take your previously prescribed muscle relaxer.

## 2025-02-19 NOTE — PROGRESS NOTES
Nadeen Adan (:  2000) is a 24 y.o. female,  here for evaluation of the following chief complaint(s): Flank Pain (possible kidney stones, lower back pain , bilateral , States that she has a HX of kidney stones , X 1 mo )    Nadeen Adan is a: New patient.   Last Urgent Care Visit: Visit date not found  I have reviewed the patient's medications and basic medical history; see Medication Reconciliation.    ASSESSMENT/PLAN:  Diagnosis:     ICD-10-CM    1. Right flank pain  R10.9 POCT Urinalysis no Micro     Culture, Urine     US GALLBLADDER RUQ     lidocaine (LIDODERM) 5 %      2. RUQ abdominal pain  R10.11 US GALLBLADDER RUQ             Medical Decision Making:   Patient was seen at Urgent Care today for ongoing right flank and upper abdominal pain.  This has been constant and waxing and waning since approximately 2025.      She was seen in ED on 1/15/2025.  Medical chart as well as labs and CT report are reviewed from that visit.  CT was negative for any acute findings.  Urine was clear.  Labs were also noted to be unremarkable.   Initially was thought to have been a recently passed stone but pain persist and this would not be typical of residual kidney stone pain.    On exam there is mild to moderate tenderness over the right flank area that extends somewhat to the right side of the abdomen along the rib margin.  Remainder of abdomen is nontender and there is no left CVA tenderness.    Differential diagnosis includes but is not limited to: Kidney stone, pyelonephritis, UTI, gallbladder pathology such as cholecystitis, cholelithiasis.  This could also be more musculoskeletal in nature.  Pain does not extend above the rib margin and she denies any chest pain or shortness of breath.  I do not have any concern for PE, pneumonia, pleurisy or other acute cardiopulmonary process.  She also has lupus but has never had any similar symptoms.  Her lupus flares typically involve rash and some generalized

## 2025-02-20 ENCOUNTER — HOSPITAL ENCOUNTER (OUTPATIENT)
Dept: ULTRASOUND IMAGING | Age: 25
Discharge: HOME | End: 2025-02-20
Payer: COMMERCIAL

## 2025-02-20 ENCOUNTER — HOSPITAL ENCOUNTER (EMERGENCY)
Age: 25
Discharge: HOME | End: 2025-02-20
Payer: COMMERCIAL

## 2025-02-20 VITALS
WEIGHT: 184.4 LBS | OXYGEN SATURATION: 100 % | BODY MASS INDEX: 33.93 KG/M2 | DIASTOLIC BLOOD PRESSURE: 73 MMHG | RESPIRATION RATE: 18 BRPM | TEMPERATURE: 98.2 F | SYSTOLIC BLOOD PRESSURE: 121 MMHG | HEART RATE: 77 BPM | HEIGHT: 62 IN

## 2025-02-20 DIAGNOSIS — R10.9 RIGHT FLANK PAIN: ICD-10-CM

## 2025-02-20 DIAGNOSIS — R10.11 ABDOMINAL PAIN, RIGHT UPPER QUADRANT: Primary | ICD-10-CM

## 2025-02-20 DIAGNOSIS — R10.11 RUQ ABDOMINAL PAIN: ICD-10-CM

## 2025-02-20 DIAGNOSIS — R11.2 NAUSEA AND VOMITING, UNSPECIFIED VOMITING TYPE: ICD-10-CM

## 2025-02-20 LAB
ALBUMIN SERPL-MCNC: 4.2 G/DL (ref 3.4–5)
ALBUMIN/GLOB SERPL: 2 {RATIO} (ref 1.1–2.2)
ALP SERPL-CCNC: 83 U/L (ref 40–129)
ALT SERPL-CCNC: 13 U/L (ref 10–40)
ANION GAP SERPL CALCULATED.3IONS-SCNC: 9 MMOL/L (ref 3–16)
AST SERPL-CCNC: 15 U/L (ref 15–37)
BACTERIA URNS QL MICRO: ABNORMAL /HPF
BASOPHILS # BLD: 0.1 K/UL (ref 0–0.2)
BASOPHILS NFR BLD: 0.8 %
BILIRUB SERPL-MCNC: 0.3 MG/DL (ref 0–1)
BILIRUB UR QL STRIP.AUTO: NEGATIVE
BUN SERPL-MCNC: 9 MG/DL (ref 7–20)
CALCIUM SERPL-MCNC: 9.3 MG/DL (ref 8.3–10.6)
CHLORIDE SERPL-SCNC: 106 MMOL/L (ref 99–110)
CLARITY UR: CLEAR
CO2 SERPL-SCNC: 25 MMOL/L (ref 21–32)
COLOR UR: ABNORMAL
CREAT SERPL-MCNC: 0.6 MG/DL (ref 0.6–1.1)
DEPRECATED RDW RBC AUTO: 12.5 % (ref 12.4–15.4)
EOSINOPHIL # BLD: 0.3 K/UL (ref 0–0.6)
EOSINOPHIL NFR BLD: 2.9 %
EPI CELLS #/AREA URNS HPF: ABNORMAL /HPF (ref 0–5)
GFR SERPLBLD CREATININE-BSD FMLA CKD-EPI: >90 ML/MIN/{1.73_M2}
GLUCOSE SERPL-MCNC: 79 MG/DL (ref 70–99)
GLUCOSE UR STRIP.AUTO-MCNC: NEGATIVE MG/DL
HCG SERPL QL: NEGATIVE
HCT VFR BLD AUTO: 40.5 % (ref 36–48)
HGB BLD-MCNC: 14.5 G/DL (ref 12–16)
HGB UR QL STRIP.AUTO: ABNORMAL
KETONES UR STRIP.AUTO-MCNC: NEGATIVE MG/DL
LEUKOCYTE ESTERASE UR QL STRIP.AUTO: NEGATIVE
LIPASE SERPL-CCNC: 144 U/L (ref 13–60)
LYMPHOCYTES # BLD: 2.2 K/UL (ref 1–5.1)
LYMPHOCYTES NFR BLD: 22.6 %
MCH RBC QN AUTO: 33.2 PG (ref 26–34)
MCHC RBC AUTO-ENTMCNC: 35.7 G/DL (ref 31–36)
MCV RBC AUTO: 93 FL (ref 80–100)
MONOCYTES # BLD: 0.9 K/UL (ref 0–1.3)
MONOCYTES NFR BLD: 9.6 %
MUCOUS THREADS #/AREA URNS LPF: ABNORMAL /LPF
NEUTROPHILS # BLD: 6.1 K/UL (ref 1.7–7.7)
NEUTROPHILS NFR BLD: 64.1 %
NITRITE UR QL STRIP.AUTO: NEGATIVE
PH UR STRIP.AUTO: 5.5 [PH] (ref 5–8)
PLATELET # BLD AUTO: 268 K/UL (ref 135–450)
PMV BLD AUTO: 7.1 FL (ref 5–10.5)
POTASSIUM SERPL-SCNC: 4.1 MMOL/L (ref 3.5–5.1)
PROT SERPL-MCNC: 6.3 G/DL (ref 6.4–8.2)
PROT UR STRIP.AUTO-MCNC: NEGATIVE MG/DL
RBC # BLD AUTO: 4.36 M/UL (ref 4–5.2)
RBC #/AREA URNS HPF: ABNORMAL /HPF (ref 0–4)
SODIUM SERPL-SCNC: 140 MMOL/L (ref 136–145)
SP GR UR STRIP.AUTO: >=1.03 (ref 1–1.03)
UA COMPLETE W REFLEX CULTURE PNL UR: ABNORMAL
UA DIPSTICK W REFLEX MICRO PNL UR: YES
URN SPEC COLLECT METH UR: ABNORMAL
UROBILINOGEN UR STRIP-ACNC: 0.2 E.U./DL
WBC # BLD AUTO: 9.5 K/UL (ref 4–11)
WBC #/AREA URNS HPF: ABNORMAL /HPF (ref 0–5)

## 2025-02-20 PROCEDURE — 36415 COLL VENOUS BLD VENIPUNCTURE: CPT

## 2025-02-20 PROCEDURE — 83690 ASSAY OF LIPASE: CPT

## 2025-02-20 PROCEDURE — 6360000002 HC RX W HCPCS: Performed by: PHYSICIAN ASSISTANT

## 2025-02-20 PROCEDURE — 76705 ECHO EXAM OF ABDOMEN: CPT

## 2025-02-20 PROCEDURE — 96375 TX/PRO/DX INJ NEW DRUG ADDON: CPT

## 2025-02-20 PROCEDURE — 85025 COMPLETE CBC W/AUTO DIFF WBC: CPT

## 2025-02-20 PROCEDURE — 96374 THER/PROPH/DIAG INJ IV PUSH: CPT

## 2025-02-20 PROCEDURE — 84703 CHORIONIC GONADOTROPIN ASSAY: CPT

## 2025-02-20 PROCEDURE — 81001 URINALYSIS AUTO W/SCOPE: CPT

## 2025-02-20 PROCEDURE — 99284 EMERGENCY DEPT VISIT MOD MDM: CPT

## 2025-02-20 PROCEDURE — 80053 COMPREHEN METABOLIC PANEL: CPT

## 2025-02-20 RX ORDER — METOCLOPRAMIDE 10 MG/1
10 TABLET ORAL 4 TIMES DAILY PRN
Qty: 20 TABLET | Refills: 0 | Status: SHIPPED | OUTPATIENT
Start: 2025-02-20

## 2025-02-20 RX ORDER — METOCLOPRAMIDE HYDROCHLORIDE 5 MG/ML
10 INJECTION INTRAMUSCULAR; INTRAVENOUS ONCE
Status: COMPLETED | OUTPATIENT
Start: 2025-02-20 | End: 2025-02-20

## 2025-02-20 RX ORDER — DIPHENHYDRAMINE HYDROCHLORIDE 50 MG/ML
25 INJECTION INTRAMUSCULAR; INTRAVENOUS ONCE
Status: COMPLETED | OUTPATIENT
Start: 2025-02-20 | End: 2025-02-20

## 2025-02-20 RX ORDER — KETOROLAC TROMETHAMINE 30 MG/ML
30 INJECTION, SOLUTION INTRAMUSCULAR; INTRAVENOUS ONCE
Status: COMPLETED | OUTPATIENT
Start: 2025-02-20 | End: 2025-02-20

## 2025-02-20 RX ADMIN — METOCLOPRAMIDE HYDROCHLORIDE 10 MG: 5 INJECTION INTRAMUSCULAR; INTRAVENOUS at 15:17

## 2025-02-20 RX ADMIN — KETOROLAC TROMETHAMINE 30 MG: 30 INJECTION, SOLUTION INTRAMUSCULAR at 15:17

## 2025-02-20 RX ADMIN — DIPHENHYDRAMINE HYDROCHLORIDE 25 MG: 50 INJECTION INTRAMUSCULAR; INTRAVENOUS at 15:15

## 2025-02-20 ASSESSMENT — PAIN - FUNCTIONAL ASSESSMENT: PAIN_FUNCTIONAL_ASSESSMENT: 0-10

## 2025-02-20 ASSESSMENT — PAIN SCALES - GENERAL: PAINLEVEL_OUTOF10: 8

## 2025-02-20 NOTE — ED PROVIDER NOTES
[facility] EMERGENCY DEPARTMENT  EMERGENCY DEPARTMENT ENCOUNTER        Pt Name: Nadeen Adan  MRN: [id number]  Birthdate 2000  Date of evaluation: 2/20/2025  Provider: ANA ASHFORD PA-C  PCP: Alcides Cohn MD  ED Attending: MD Annika      ALEX. I have evaluated this patient.      CHIEF COMPLAINT:     Chief Complaint   Patient presents with    Flank Pain     Pt to ED for right sided flank pain that is radiating into her abdomen, pt states she had an US this morning of her liver, kidney and gallbladder.   Pt states the pain is getting worse and she is having dark stools that she thinks is blood.        HISTORY OF PRESENT ILLNESS:      History provided by the patient. No limitations.    Nadeen Adan is a 24 y.o. female who arrives to the ED by private vehicle.  Patient here reporting recurrent right flank and right upper quadrant pain.  She is also had nausea and vomiting.  This has been an issue for her for a couple months.  She was seen twice in January for this.  She was at urgent care yesterday and ultimately had an ultrasound done of the right upper quadrant that was normal.  She has seen GI through [facility] and tried to get an appointment with Dr. Washburn, but states he was booked out for a year and she could not get in sooner than 1 year from now.  She has an appointment with her PCP coming up on Monday, but did not think she could wait that long due to the pain as well as the nausea and vomiting.  She has Zofran at home but states that is not working.    Nursing Notes were reviewed     REVIEW OF SYSTEMS:     Review of Systems  Positives and pertinent negatives as per HPI.      PAST MEDICAL HISTORY:     Past Medical History:   Diagnosis Date    Anxiety     Depression     EDS (Radha-Danlos syndrome)     PCOS (polycystic ovarian syndrome)     POTS (postural orthostatic tachycardia syndrome)     Pre-diabetes        SURGICAL HISTORY:      Past Surgical History:   Procedure Laterality Date

## 2025-02-21 LAB — BACTERIA UR CULT: NORMAL
